# Patient Record
Sex: FEMALE | Employment: FULL TIME | URBAN - METROPOLITAN AREA
[De-identification: names, ages, dates, MRNs, and addresses within clinical notes are randomized per-mention and may not be internally consistent; named-entity substitution may affect disease eponyms.]

---

## 2019-03-29 ENCOUNTER — HOSPITAL ENCOUNTER (OUTPATIENT)
Dept: INFUSION THERAPY | Age: 29
Discharge: HOME | End: 2019-03-29
Payer: OTHER GOVERNMENT

## 2019-03-29 ENCOUNTER — OFFICE VISIT (OUTPATIENT)
Dept: ONCOLOGY | Age: 29
End: 2019-03-29

## 2019-03-29 VITALS
TEMPERATURE: 97.3 F | SYSTOLIC BLOOD PRESSURE: 110 MMHG | DIASTOLIC BLOOD PRESSURE: 67 MMHG | WEIGHT: 163 LBS | HEART RATE: 64 BPM | RESPIRATION RATE: 18 BRPM | OXYGEN SATURATION: 100 %

## 2019-03-29 VITALS
HEART RATE: 64 BPM | TEMPERATURE: 97.3 F | DIASTOLIC BLOOD PRESSURE: 67 MMHG | OXYGEN SATURATION: 100 % | SYSTOLIC BLOOD PRESSURE: 110 MMHG

## 2019-03-29 DIAGNOSIS — D64.9 ANEMIA, UNSPECIFIED TYPE: ICD-10-CM

## 2019-03-29 DIAGNOSIS — D64.9 ANEMIA, UNSPECIFIED TYPE: Primary | ICD-10-CM

## 2019-03-29 LAB
BASO+EOS+MONOS # BLD AUTO: 0.4 K/UL (ref 0–2.3)
BASO+EOS+MONOS NFR BLD AUTO: 7 % (ref 0.1–17)
DIFFERENTIAL METHOD BLD: ABNORMAL
ERYTHROCYTE [DISTWIDTH] IN BLOOD BY AUTOMATED COUNT: 14.1 % (ref 11.5–14.5)
FERRITIN SERPL-MCNC: 46 NG/ML (ref 8–388)
FOLATE SERPL-MCNC: >20 NG/ML (ref 3.1–17.5)
HCT VFR BLD AUTO: 35.4 % (ref 36–48)
HGB BLD-MCNC: 10.9 G/DL (ref 12–16)
IRON SATN MFR SERPL: 15 %
IRON SERPL-MCNC: 61 UG/DL (ref 50–175)
LYMPHOCYTES # BLD: 3.1 K/UL (ref 1.1–5.9)
LYMPHOCYTES NFR BLD: 51 % (ref 14–44)
MCH RBC QN AUTO: 24.3 PG (ref 25–35)
MCHC RBC AUTO-ENTMCNC: 30.8 G/DL (ref 31–37)
MCV RBC AUTO: 78.8 FL (ref 78–102)
NEUTS SEG # BLD: 2.6 K/UL (ref 1.8–9.5)
NEUTS SEG NFR BLD: 43 % (ref 40–70)
PLATELET # BLD AUTO: 331 K/UL (ref 140–440)
RBC # BLD AUTO: 4.49 M/UL (ref 4.1–5.1)
TIBC SERPL-MCNC: 394 UG/DL (ref 250–450)
VIT B12 SERPL-MCNC: 1172 PG/ML (ref 211–911)
WBC # BLD AUTO: 6.1 K/UL (ref 4.5–13)

## 2019-03-29 PROCEDURE — 82728 ASSAY OF FERRITIN: CPT

## 2019-03-29 PROCEDURE — 83021 HEMOGLOBIN CHROMOTOGRAPHY: CPT

## 2019-03-29 PROCEDURE — 36415 COLL VENOUS BLD VENIPUNCTURE: CPT

## 2019-03-29 PROCEDURE — 85025 COMPLETE CBC W/AUTO DIFF WBC: CPT

## 2019-03-29 PROCEDURE — 82607 VITAMIN B-12: CPT

## 2019-03-29 PROCEDURE — 83540 ASSAY OF IRON: CPT

## 2019-03-29 RX ORDER — POLYETHYLENE GLYCOL 3350 17 G/17G
POWDER, FOR SOLUTION ORAL
COMMUNITY
Start: 2019-02-02

## 2019-03-29 NOTE — PROGRESS NOTES
[facility]  [id number] [place] [address], [address]  [place], [facility]  Office Phone: [phone]  Fax: [phone]      Reason for visit:  Abnormal Lab Results      HPI:   Ebony Guerrero is a 25 y.o.  female  who I was asked to see in consultation at the request of Dr. Diaz Shea for evaluation for abnormal CBC. DX   Encounter Diagnosis   Name Primary?  Anemia, unspecified type Yes     PMHx: constipation    PSHx: 3 Csection    SHX:   Social History     Socioeconomic History    Marital status:      Spouse name: Not on file    Number of children: Not on file    Years of education: Not on file    Highest education level: Not on file     Allergies:  Review of Systems    All 12 point of comprehensive review of system are negative except for nausea and joint as well as fatigue. Objective:  Physical Exam:    Visit Vitals  /67 (BP 1 Location: Left arm, BP Patient Position: Sitting)   Pulse 64   Temp 97.3 °F (36.3 °C) (Oral)   Resp 18   Wt 73.9 kg (163 lb)   LMP 2019   SpO2 100%       General:  Alert, cooperative, no distress, appears stated age. Head:  Normocephalic, without obvious abnormality, atraumatic. Eyes:  Conjunctivae/corneas clear. PERRL, EOMs intact. Pale    Throat: Lips, mucosa, and tongue normal.    Neck: Supple, symmetrical, trachea midline, no adenopathy, thyroid: no enlargement/tenderness/nodules   Back:   Symmetric, no curvature. ROM normal. No CVA tenderness. Lungs:   Clear to auscultation bilaterally. Chest wall:  No tenderness or deformity. Heart:  Regular rate and rhythm, S1, S2 normal, no murmur, click, rub or gallop. Abdomen:   Soft, non-tender. Bowel sounds normal. No masses,  No organomegaly. Extremities: Extremities normal, atraumatic, no cyanosis or edema. Skin: Skin color, texture, turgor normal. No rashes or lesions.    Lymph nodes: Cervical, supraclavicular, and axillary nodes normal. Neurologic: CNII-XII intact. Diagnostic Imaging     No results found for this or any previous visit. Lab Results  No results found for: WBC, HGB, HGBP, HCT, PHCT, RBCH, PLT, MCV, HGBEXT, HCTEXT, PLTEXT    No results found for: NA, K, CL, CO2, AGAP, GLU, BUN, CREA, BUCR, GFRAA, GFRNA, CA, TBIL, GPT, SGOT, AP, TP, ALB, GLOB, AGRAT, ALT    Assessment/Plan:  25 y.o. female  1. Anemia, unspecified type  Patient has no previous records. Get records from PCP. Run below labs. - CBC WITH AUTOMATED DIFF; Future  - IRON PROFILE; Future  - FERRITIN; Future  - VITAMIN B12 & FOLATE; Future  - HEMOGLOBIN FRACTIONATION; Future  - HEMOGLOBIN FRACTIONATION; Future    2.  Menorrhagia: Needs obgyn    RTC in 1 weeks

## 2019-03-29 NOTE — PROGRESS NOTES
[facility] Progress Note Date: 2019 Name: Cy Marques MRN: [id number] : 1990 Peripheral Lab Draw Ms. Meadows to [facility], ambulatory at [phone] accompanied by self. Pt was assessed and education was provided. Ms. Yumi Back vitals were reviewed and patient was observed for 5 minutes prior to treatment. Visit Vitals /67 (BP 1 Location: Left arm, BP Patient Position: Sitting) Pulse 64 Temp 97.3 °F (36.3 °C) (Oral) SpO2 100% Blood obtained peripherally from left arm x 1 attempt with butterfly needle and sent to lab for Cbc w/ diff, Vitamin B12 & folate, Ferritin, Iron Profile, and Hemoglobin Fractionation per written orders. No bleeding or hematoma noted at site. Gauze and coban applied. Recent Results (from the past 12 hour(s)) CBC WITH 3 PART DIFF Collection Time: 19 11:02 AM  
Result Value Ref Range WBC 6.1 4.5 - 13.0 K/uL  
 RBC 4.49 4. 10 - 5.10 M/uL  
 HGB 10.9 (L) 12.0 - 16.0 g/dL HCT 35.4 (L) 36 - 48 % MCV 78.8 78 - 102 FL  
 MCH 24.3 (L) 25.0 - 35.0 PG  
 MCHC 30.8 (L) 31 - 37 g/dL  
 RDW 14.1 11.5 - 14.5 % PLATELET 836 487 - 979 K/uL NEUTROPHILS 43 40 - 70 % MIXED CELLS 7 0.1 - 17 % LYMPHOCYTES 51 (H) 14 - 44 % ABS. NEUTROPHILS 2.6 1.8 - 9.5 K/UL  
 ABS. MIXED CELLS 0.4 0.0 - 2.3 K/uL  
 ABS. LYMPHOCYTES 3.1 1.1 - 5.9 K/UL  
 DF AUTOMATED Ms. Ivania Alejandra tolerated the phlebotomy, and had no complaints. Patient armband removed and shredded. Ms. Ivania Alejandra was discharged from [place] [address] in stable condition at 1107. Luly Purvis 2019 
1:01 PM

## 2019-04-01 LAB
DEPRECATED HGB OTHER BLD-IMP: 0 %
HGB A MFR BLD: 97.7 % (ref 96.4–98.8)
HGB A2 MFR BLD COLUMN CHROM: 2.3 % (ref 1.8–3.2)
HGB C MFR BLD: 0 %
HGB F MFR BLD: 0 % (ref 0–2)
HGB FRACT BLD-IMP: NORMAL
HGB S BLD QL SOLY: NEGATIVE
HGB S MFR BLD: 0 %

## 2019-04-05 ENCOUNTER — HOSPITAL ENCOUNTER (OUTPATIENT)
Dept: INFUSION THERAPY | Age: 29
Discharge: HOME | End: 2019-04-05
Payer: OTHER GOVERNMENT

## 2019-04-05 VITALS
SYSTOLIC BLOOD PRESSURE: 107 MMHG | OXYGEN SATURATION: 99 % | DIASTOLIC BLOOD PRESSURE: 70 MMHG | RESPIRATION RATE: 17 BRPM | TEMPERATURE: 97.9 F | HEART RATE: 61 BPM

## 2019-04-05 PROCEDURE — 74011250636 HC RX REV CODE- 250/636: Performed by: INTERNAL MEDICINE

## 2019-04-05 PROCEDURE — 96365 THER/PROPH/DIAG IV INF INIT: CPT

## 2019-04-05 RX ADMIN — FERRIC CARBOXYMALTOSE INJECTION 750 MG: 50 INJECTION, SOLUTION INTRAVENOUS at 14:46

## 2019-04-05 NOTE — PROGRESS NOTES
[facility] Progress Note Date: 2019 Name: Paula Maher MRN: [id number] : 1990 Injectafer Infusion Ms. Meadows to [facility], ambulatory, at 1355. Pt was assessed and education was provided. Ms. Edgar Stout vitals were reviewed and patient was observed for 5 minutes prior to treatment. Visit Vitals /70 (BP 1 Location: Left arm, BP Patient Position: Sitting) Pulse 61 Temp 97.9 °F (36.6 °C) Resp 17 SpO2 99% Breastfeeding? No  
 
 
 
22g PIV placed in left antecubital x1 attempt. PIV flushed easily and had brisk blood return. Injectafer 750mg in 250mL 0.9% sodium chloride was infused over 20 minutes. Ms. Remberto Higginbotham tolerated the infusion, and had no complaints. VS remained stable. PIV flushed with NS 10 ml and removed. No bleeding or hematoma noted at site. Gauze and coban applied. Reviewed discharge instructions with patient, including expected side effects (abdominal cramping, nausea, changes in color of urine or feces) and signs of allergic reaction requiring medical attention (itching/hives/rashes, SOB, chest pain, lip/tongue/facial swelling). Patient given printed copy to take home. Patient verbalized understanding of discharge instructions. CareNotes provided, signed and scanned into patient chart. Patient armband removed and shredded. Ms. Remberto Higginbotham was discharged from [place] [address] in stable condition at 1552. She is to return on 19 at 1430 for her next [facility]fusion. Joane Scheuermann, RN 2019 
1552

## 2019-04-15 ENCOUNTER — HOSPITAL ENCOUNTER (OUTPATIENT)
Dept: INFUSION THERAPY | Age: 29
Discharge: HOME | End: 2019-04-15
Payer: OTHER GOVERNMENT

## 2019-04-15 VITALS
SYSTOLIC BLOOD PRESSURE: 114 MMHG | RESPIRATION RATE: 18 BRPM | DIASTOLIC BLOOD PRESSURE: 71 MMHG | TEMPERATURE: 98.7 F | OXYGEN SATURATION: 100 % | HEART RATE: 98 BPM

## 2019-04-15 PROCEDURE — 74011250636 HC RX REV CODE- 250/636: Performed by: INTERNAL MEDICINE

## 2019-04-15 PROCEDURE — 96365 THER/PROPH/DIAG IV INF INIT: CPT

## 2019-04-15 RX ADMIN — FERRIC CARBOXYMALTOSE INJECTION 750 MG: 50 INJECTION, SOLUTION INTRAVENOUS at 15:21

## 2019-04-15 NOTE — PROGRESS NOTES
[address] [facility] Progress Note Date: April 15, 2019 Name: Jyoti Bland MRN: [id number] : 1990 Injectafer Infusion 2 of 2. Ms. Mayur Stearns to [facility], [facility], at 12. Pt was assessed and education was provided. Ms. Jemma Carvajal vitals were reviewed and patient was observed for 5 minutes prior to treatment. Visit Vitals /71 (BP 1 Location: Left arm, BP Patient Position: Sitting) Pulse 98 Temp 98.7 °F (37.1 °C) Resp 18 SpO2 100% 24g PIV placed in left antecubital x1 attempt. PIV flushed easily and had brisk blood return. Injectafer 750mg in 250mL 0.9% sodium chloride was infused over 20 minutes. Ms. Mayur Stearns tolerated the infusion, and had no complaints. VS remained stable. PIV flushed with NS 10 ml and removed. No bleeding or hematoma noted at site. Gauze and coban applied. Patient armband removed and shredded. Ms. Mayur Stearns was discharged from [place] [address] in stable condition at 1600. She has no future appointments with hospitals at this time. Brenton Marques April 15, 2019 
[id number]

## 2019-04-23 ENCOUNTER — OFFICE VISIT (OUTPATIENT)
Dept: ONCOLOGY | Age: 29
End: 2019-04-23

## 2019-04-23 VITALS
SYSTOLIC BLOOD PRESSURE: 107 MMHG | HEART RATE: 71 BPM | WEIGHT: 168 LBS | TEMPERATURE: 97.8 F | DIASTOLIC BLOOD PRESSURE: 70 MMHG | RESPIRATION RATE: 18 BRPM | OXYGEN SATURATION: 100 %

## 2019-04-23 DIAGNOSIS — D64.9 ANEMIA, UNSPECIFIED TYPE: Primary | ICD-10-CM

## 2019-04-23 DIAGNOSIS — N92.1 MENORRHAGIA WITH IRREGULAR CYCLE: ICD-10-CM

## 2019-04-23 NOTE — PROGRESS NOTES
Sarah Banegas is a 25 y.o. female presenting today for a follow-up r/t iron deficiency anemia. Patient is ambulatory with no assistive devices and complain of difficultly sleeping on right side and back. Chief Complaint Patient presents with  Anemia  
  follow up Visit Vitals /70 (BP 1 Location: Right arm, BP Patient Position: Sitting) Pulse 71 Temp 97.8 °F (36.6 °C) (Oral) Resp 18 Wt 76.2 kg (168 lb) LMP 03/21/2019 (Approximate) SpO2 100% Current Outpatient Medications Medication Sig  polyethylene glycol (MIRALAX) 17 gram/dose powder No current facility-administered medications for this visit. Medications no longer taking/discontinued: miralax 3 most recent PHQ Screens 4/23/2019 Little interest or pleasure in doing things Not at all Feeling down, depressed, irritable, or hopeless Not at all Total Score PHQ 2 0  
 
 
 
1. Have you been to the ER, urgent care clinic since your last visit? Hospitalized since your last visit?no 2. Have you seen or consulted any other health care providers outside of the [address] [place] [address] since your last visit? Include any pap smears or colon screening.  no

## 2019-04-23 NOTE — PROGRESS NOTES
Yoan Hubbard is a 25 y.o. 1990 female   with iron deficiency anemia status post IV iron infusionx2 on 4/15/19, here for follow-up. 
  
 
History reviewed. No pertinent past medical history. Past Surgical History:  
Procedure Laterality Date  HX  SECTION    
 x3 Social History Socioeconomic History  Marital status:  Spouse name: Not on file  Number of children: Not on file  Years of education: Not on file  Highest education level: Not on file Tobacco Use  Smoking status: Never Smoker  Smokeless tobacco: Never Used Substance and Sexual Activity  Alcohol use: Not Currently Frequency: Never  Drug use: Never  Sexual activity: Yes  
  Partners: Male Birth control/protection: Surgical  
  Comment: Spouse - vasectomy Family History Problem Relation Age of Onset  No Known Problems Mother  No Known Problems Father Current Outpatient Medications Medication Sig Dispense Refill  polyethylene glycol (MIRALAX) 17 gram/dose powder Allergies Allergen Reactions  Chloroquine Itching Review of Systems A comprehensive review of systems was negative. Objective: 
Visit Vitals /70 (BP 1 Location: Right arm, BP Patient Position: Sitting) Pulse 71 Temp 97.8 °F (36.6 °C) (Oral) Resp 18 Wt 76.2 kg (168 lb) LMP 2019 (Approximate) SpO2 100% Physical Exam:  
General appearance - alert, well appearing, and in no distress Mental status - alert, oriented to person, place, and time EYE-MARKUS, EOMI 
ENT-ENT exam normal, no neck nodes or sinus tenderness Mouth - mucous membranes moist, pharynx normal without lesions Neck - supple, no significant adenopathy Chest - clear to auscultation, no wheezes, rales or rhonchi, symmetric air entry Heart - normal rate and regular rhythm Abdomen - soft, nontender, nondistended, no masses or organomegaly Lymph- no adenopathy palpable Ext-no pedal edema noted Skin-Warm and dry. Neuro -alert, oriented, normal speech, no focal findings or movement disorder noted Diagnostic Imaging No results found for this or any previous visit. No results found for this or any previous visit. No results found for this or any previous visit. Lab Results Lab Results Component Value Date/Time WBC 6.1 03/29/2019 11:02 AM  
 HGB 10.9 (L) 03/29/2019 11:02 AM  
 HCT 35.4 (L) 03/29/2019 11:02 AM  
 PLATELET 637 77/27/3965 11:02 AM  
 MCV 78.8 03/29/2019 11:02 AM  
 
 
No results found for: NA, K, CL, CO2, AGAP, GLU, BUN, CREA, BUCR, GFRAA, GFRNA, CA, TBIL, GPT, SGOT, AP, TP, ALB, GLOB, AGRAT, ALT Assessment/Plan: 1. Anemia, unspecified type Patient with iron deficiency anemia status post iron infusion x2 on 4/15/19. Repeat CBC, CMP, Ferritin and iron panel one week before next appointment 
  
2. Menorrhagia: Refer obgyn RTC 3 months 
  
 
 
 
Vonda Carlisle MD

## 2019-07-17 ENCOUNTER — HOSPITAL ENCOUNTER (OUTPATIENT)
Dept: INFUSION THERAPY | Age: 29
Discharge: HOME | End: 2019-07-17
Payer: OTHER GOVERNMENT

## 2019-07-17 VITALS — SYSTOLIC BLOOD PRESSURE: 104 MMHG | DIASTOLIC BLOOD PRESSURE: 62 MMHG | HEART RATE: 64 BPM | TEMPERATURE: 97.4 F

## 2019-07-17 DIAGNOSIS — D64.9 ANEMIA, UNSPECIFIED TYPE: ICD-10-CM

## 2019-07-17 LAB
ALBUMIN SERPL-MCNC: 3.8 G/DL (ref 3.4–5)
ALBUMIN/GLOB SERPL: 1 {RATIO} (ref 0.8–1.7)
ALP SERPL-CCNC: 50 U/L (ref 45–117)
ALT SERPL-CCNC: 21 U/L (ref 13–56)
ANION GAP SERPL CALC-SCNC: 4 MMOL/L (ref 3–18)
AST SERPL-CCNC: 9 U/L (ref 15–37)
BASO+EOS+MONOS # BLD AUTO: 0.4 K/UL (ref 0–2.3)
BASO+EOS+MONOS NFR BLD AUTO: 7 % (ref 0.1–17)
BILIRUB SERPL-MCNC: 0.4 MG/DL (ref 0.2–1)
BUN SERPL-MCNC: 12 MG/DL (ref 7–18)
BUN/CREAT SERPL: 15 (ref 12–20)
CALCIUM SERPL-MCNC: 9 MG/DL (ref 8.5–10.1)
CHLORIDE SERPL-SCNC: 105 MMOL/L (ref 100–108)
CO2 SERPL-SCNC: 28 MMOL/L (ref 21–32)
CREAT SERPL-MCNC: 0.79 MG/DL (ref 0.6–1.3)
DIFFERENTIAL METHOD BLD: ABNORMAL
ERYTHROCYTE [DISTWIDTH] IN BLOOD BY AUTOMATED COUNT: 13.6 % (ref 11.5–14.5)
FERRITIN SERPL-MCNC: 445 NG/ML (ref 8–388)
GLOBULIN SER CALC-MCNC: 4 G/DL (ref 2–4)
GLUCOSE SERPL-MCNC: 81 MG/DL (ref 74–99)
HCT VFR BLD AUTO: 36.5 % (ref 36–48)
HGB BLD-MCNC: 11.3 G/DL (ref 12–16)
IRON SATN MFR SERPL: 32 %
IRON SERPL-MCNC: 103 UG/DL (ref 50–175)
LYMPHOCYTES # BLD: 2.6 K/UL (ref 1.1–5.9)
LYMPHOCYTES NFR BLD: 44 % (ref 14–44)
MCH RBC QN AUTO: 24.7 PG (ref 25–35)
MCHC RBC AUTO-ENTMCNC: 31 G/DL (ref 31–37)
MCV RBC AUTO: 79.9 FL (ref 78–102)
NEUTS SEG # BLD: 3 K/UL (ref 1.8–9.5)
NEUTS SEG NFR BLD: 50 % (ref 40–70)
PLATELET # BLD AUTO: 254 K/UL (ref 140–440)
POTASSIUM SERPL-SCNC: 4 MMOL/L (ref 3.5–5.5)
PROT SERPL-MCNC: 7.8 G/DL (ref 6.4–8.2)
RBC # BLD AUTO: 4.57 M/UL (ref 4.1–5.1)
SODIUM SERPL-SCNC: 137 MMOL/L (ref 136–145)
TIBC SERPL-MCNC: 320 UG/DL (ref 250–450)
WBC # BLD AUTO: 6 K/UL (ref 4.5–13)

## 2019-07-17 PROCEDURE — 85025 COMPLETE CBC W/AUTO DIFF WBC: CPT

## 2019-07-17 PROCEDURE — 80053 COMPREHEN METABOLIC PANEL: CPT

## 2019-07-17 PROCEDURE — 36415 COLL VENOUS BLD VENIPUNCTURE: CPT

## 2019-07-17 PROCEDURE — 83540 ASSAY OF IRON: CPT

## 2019-07-17 PROCEDURE — 82728 ASSAY OF FERRITIN: CPT

## 2019-07-17 NOTE — PROGRESS NOTES
[facility] Progress Note    Date: 2019    Name: Oriana Alvarez    MRN: [de-identified]         : 1990    Peripheral Lab Draw      Ms. Meadows to [facility], ambulatory at 0935 accompanied by self. Pt was assessed and education was provided. Ms. Yancy Barber vitals were reviewed and patient was observed for 5 minutes prior to treatment. Visit Vitals  /62 (BP 1 Location: Left arm, BP Patient Position: Sitting)   Pulse 64   Temp 97.4 °F (36.3 °C)     Recent Results (from the past 12 hour(s))   CBC WITH 3 PART DIFF    Collection Time: 19  9:40 AM   Result Value Ref Range    WBC 6.0 4.5 - 13.0 K/uL    RBC 4.57 4.10 - 5.10 M/uL    HGB 11.3 (L) 12.0 - 16.0 g/dL    HCT 36.5 36 - 48 %    MCV 79.9 78 - 102 FL    MCH 24.7 (L) 25.0 - 35.0 PG    MCHC 31.0 31 - 37 g/dL    RDW 13.6 11.5 - 14.5 %    PLATELET 978 349 - 908 K/uL    NEUTROPHILS 50 40 - 70 %    MIXED CELLS 7 0.1 - 17 %    LYMPHOCYTES 44 14 - 44 %    ABS. NEUTROPHILS 3.0 1.8 - 9.5 K/UL    ABS. MIXED CELLS 0.4 0.0 - 2.3 K/uL    ABS. LYMPHOCYTES 2.6 1.1 - 5.9 K/UL    DF AUTOMATED         Blood obtained peripherally from left arm x 1 attempt with butterfly needle and sent to lab for Cbc w/diff, Cmp Iron Profile and Ferritin per written orders. No bleeding or hematoma noted at site. Gauze and coban applied. Ms. Jayme Hardy tolerated the phlebotomy, and had no complaints. Patient armband removed and shredded. Ms. Jayme Hardy was discharged from [place] [address] in stable condition at 10 [facility] Jayme Gerardo Phlebotomist PCT  2019  9:46 AM

## 2019-07-23 ENCOUNTER — OFFICE VISIT (OUTPATIENT)
Dept: ONCOLOGY | Age: 29
End: 2019-07-23

## 2019-07-23 VITALS
OXYGEN SATURATION: 99 % | WEIGHT: 171 LBS | SYSTOLIC BLOOD PRESSURE: 104 MMHG | HEART RATE: 65 BPM | RESPIRATION RATE: 16 BRPM | TEMPERATURE: 97.1 F | DIASTOLIC BLOOD PRESSURE: 59 MMHG

## 2019-07-23 DIAGNOSIS — D50.9 IRON DEFICIENCY ANEMIA, UNSPECIFIED IRON DEFICIENCY ANEMIA TYPE: Primary | ICD-10-CM

## 2019-07-23 NOTE — PROGRESS NOTES
Ema Forte is a 30 y.o. 1990 female and presents with Anemia (follow up)  Patient with history of menorrhagia and iron deficiency anemia, status post IV iron Injectafer x2 completed on 4/15/2019, here for follow-up. Patient was seen and examined today. She is awake alert oriented x3. Her labs on 2019 showed transferrin saturation 32%, ferritin 445, WBC 6.0, H&H 11.3/36.5, platelet 800. MCV 80. On review of systems today she denies any fevers, chills, shortness of breath, nausea vomiting or abdominal pain. Easy fatigue has resolved. She says the heaviness of her menses are much better now but still occasionally having some blood clots. Denies any pagophagia or pica. ECOG performance status 0. Independent with ADLs and IADLs. History reviewed. No pertinent past medical history. Past Surgical History:   Procedure Laterality Date    HX  SECTION      x3     Social History     Socioeconomic History    Marital status:      Spouse name: Not on file    Number of children: Not on file    Years of education: Not on file    Highest education level: Not on file   Tobacco Use    Smoking status: Never Smoker    Smokeless tobacco: Never Used   Substance and Sexual Activity    Alcohol use: Not Currently     Frequency: Never    Drug use: Never    Sexual activity: Yes     Partners: Male     Birth control/protection: Surgical     Comment: Spouse - vasectomy     Family History   Problem Relation Age of Onset    No Known Problems Mother     No Known Problems Father        Current Outpatient Medications   Medication Sig Dispense Refill    polyethylene glycol (MIRALAX) 17 gram/dose powder          Allergies   Allergen Reactions    Chloroquine Itching       Review of Systems    All 12 points of  comprehensive review of systems was negative except for: Heavy menses which are getting better.     Objective:  Visit Vitals  /59 (BP 1 Location: Left arm, BP Patient Position: Sitting)   Pulse 65   Temp 97.1 °F (36.2 °C) (Oral)   Resp 16   Wt 77.6 kg (171 lb)   LMP 07/22/2019   SpO2 99%         Physical Exam:   General appearance - alert, well appearing, and in no distress  Mental status - alert, oriented to person, place, and time  EYE-MARKUS, EOMI  ENT-ENT exam normal, no neck nodes or sinus tenderness  Mouth - mucous membranes moist, pharynx normal without lesions  Neck - supple, no significant adenopathy   Chest - clear to auscultation, no wheezes, rales or rhonchi, symmetric air entry   Heart - normal rate and regular rhythm   Abdomen - soft, nontender, nondistended, no masses or organomegaly  Lymph- no adenopathy palpable  Ext-no pedal edema noted  Skin-Warm and dry. Neuro -alert, oriented, normal speech, no focal findings or movement disorder noted      Diagnostic Imaging     No results found for this or any previous visit. No results found for this or any previous visit. No results found for this or any previous visit. Lab Results  Lab Results   Component Value Date/Time    WBC 6.0 07/17/2019 09:40 AM    HGB 11.3 (L) 07/17/2019 09:40 AM    HCT 36.5 07/17/2019 09:40 AM    PLATELET 773 40/49/8305 09:40 AM    MCV 79.9 07/17/2019 09:40 AM       Lab Results   Component Value Date/Time    Sodium 137 07/17/2019 09:40 AM    Potassium 4.0 07/17/2019 09:40 AM    Chloride 105 07/17/2019 09:40 AM    CO2 28 07/17/2019 09:40 AM    Anion gap 4 07/17/2019 09:40 AM    Glucose 81 07/17/2019 09:40 AM    BUN 12 07/17/2019 09:40 AM    Creatinine 0.79 07/17/2019 09:40 AM    BUN/Creatinine ratio 15 07/17/2019 09:40 AM    GFR est AA >60 07/17/2019 09:40 AM    GFR est non-AA >60 07/17/2019 09:40 AM    Calcium 9.0 07/17/2019 09:40 AM    AST (SGOT) 9 (L) 07/17/2019 09:40 AM    Alk.  phosphatase 50 07/17/2019 09:40 AM    Protein, total 7.8 07/17/2019 09:40 AM    Albumin 3.8 07/17/2019 09:40 AM    Globulin 4.0 07/17/2019 09:40 AM    A-G Ratio 1.0 07/17/2019 09:40 AM    ALT (SGPT) 21 07/17/2019 09:40 AM     Follow-up with PCP for health maintenance. Assessment/Plan:  1. Anemia, unspecified type  Patient with iron deficiency anemia status post iron infusion x2 on 4/15/19. She is clinically doing very well. On 7/17/2019, transferrin saturation with 32% and ferritin 445.  No need for more iron transfusion for the moment.       2. Menorrhagia: Follow-up with OB/GYN as scheduled.     RTC 3 months with repeat CBC, ferritin and iron profile      Kalyan Hardy MD

## 2019-07-23 NOTE — PROGRESS NOTES
Arin Catalan is a 30 y.o. female presenting today for a follow-up r/t iron deficiency anemia. Patient is ambulatory with no assistive devices and denies any complaints. Chief Complaint   Patient presents with    Anemia     follow up       Visit Vitals  /59 (BP 1 Location: Left arm, BP Patient Position: Sitting)   Pulse 65   Temp 97.1 °F (36.2 °C) (Oral)   Resp 16   Wt 77.6 kg (171 lb)   LMP 07/22/2019   SpO2 99%       Current Outpatient Medications   Medication Sig    polyethylene glycol (MIRALAX) 17 gram/dose powder      No current facility-administered medications for this visit. Medications no longer taking/discontinued:     No flowsheet data found. 3 most recent PHQ Screens 4/23/2019   Little interest or pleasure in doing things Not at all   Feeling down, depressed, irritable, or hopeless Not at all   Total Score PHQ 2 0       No flowsheet data found. 1. Have you been to the ER, urgent care clinic since your last visit? Hospitalized since your last visit?no    2. Have you seen or consulted any other health care providers outside of the [address] [place] [address] since your last visit? Include any pap smears or colon screening.  no

## 2019-10-16 ENCOUNTER — HOSPITAL ENCOUNTER (OUTPATIENT)
Dept: INFUSION THERAPY | Age: 29
Discharge: HOME | End: 2019-10-16
Payer: OTHER GOVERNMENT

## 2019-10-16 VITALS
SYSTOLIC BLOOD PRESSURE: 111 MMHG | HEART RATE: 71 BPM | TEMPERATURE: 97.9 F | DIASTOLIC BLOOD PRESSURE: 69 MMHG | OXYGEN SATURATION: 99 %

## 2019-10-16 DIAGNOSIS — D64.9 ANEMIA, UNSPECIFIED TYPE: ICD-10-CM

## 2019-10-16 DIAGNOSIS — D50.9 IRON DEFICIENCY ANEMIA, UNSPECIFIED IRON DEFICIENCY ANEMIA TYPE: ICD-10-CM

## 2019-10-16 LAB
BASO+EOS+MONOS # BLD AUTO: 0.3 K/UL (ref 0–2.3)
BASO+EOS+MONOS NFR BLD AUTO: 7 % (ref 0.1–17)
DIFFERENTIAL METHOD BLD: ABNORMAL
ERYTHROCYTE [DISTWIDTH] IN BLOOD BY AUTOMATED COUNT: 13.1 % (ref 11.5–14.5)
FERRITIN SERPL-MCNC: 159 NG/ML (ref 8–388)
HCT VFR BLD AUTO: 33.4 % (ref 36–48)
HGB BLD-MCNC: 10.2 G/DL (ref 12–16)
IRON SATN MFR SERPL: 16 %
IRON SERPL-MCNC: 57 UG/DL (ref 50–175)
LYMPHOCYTES # BLD: 2.6 K/UL (ref 1.1–5.9)
LYMPHOCYTES NFR BLD: 53 % (ref 14–44)
MCH RBC QN AUTO: 24.6 PG (ref 25–35)
MCHC RBC AUTO-ENTMCNC: 30.5 G/DL (ref 31–37)
MCV RBC AUTO: 80.5 FL (ref 78–102)
NEUTS SEG # BLD: 2 K/UL (ref 1.8–9.5)
NEUTS SEG NFR BLD: 40 % (ref 40–70)
PLATELET # BLD AUTO: 284 K/UL (ref 140–440)
RBC # BLD AUTO: 4.15 M/UL (ref 4.1–5.1)
TIBC SERPL-MCNC: 358 UG/DL (ref 250–450)
WBC # BLD AUTO: 4.9 K/UL (ref 4.5–13)

## 2019-10-16 PROCEDURE — 85025 COMPLETE CBC W/AUTO DIFF WBC: CPT

## 2019-10-16 PROCEDURE — 83021 HEMOGLOBIN CHROMOTOGRAPHY: CPT

## 2019-10-16 PROCEDURE — 36415 COLL VENOUS BLD VENIPUNCTURE: CPT

## 2019-10-16 PROCEDURE — 82728 ASSAY OF FERRITIN: CPT

## 2019-10-16 PROCEDURE — 83540 ASSAY OF IRON: CPT

## 2019-10-16 NOTE — PROGRESS NOTES
[address] [facility] Progress Note    Date: 2019    Name: Asim Tillman    MRN: [de-identified]         : 1990    Peripheral Lab Draw      Ms. Meadows to [facility], ambulatory at 1000 accompanied by self. Pt was assessed and education was provided. Ms. Ben Dick vitals were reviewed and patient was observed for 5 minutes prior to treatment. Visit Vitals  /69 (BP 1 Location: Left arm, BP Patient Position: Sitting)   Pulse 71   Temp 97.9 °F (36.6 °C)   SpO2 99%     Recent Results (from the past 12 hour(s))   CBC WITH 3 PART DIFF    Collection Time: 10/16/19 10:05 AM   Result Value Ref Range    WBC 4.9 4.5 - 13.0 K/uL    RBC 4.15 4.10 - 5.10 M/uL    HGB 10.2 (L) 12.0 - 16.0 g/dL    HCT 33.4 (L) 36 - 48 %    MCV 80.5 78 - 102 FL    MCH 24.6 (L) 25.0 - 35.0 PG    MCHC 30.5 (L) 31 - 37 g/dL    RDW 13.1 11.5 - 14.5 %    PLATELET 315 181 - 380 K/uL    NEUTROPHILS 40 40 - 70 %    MIXED CELLS 7 0.1 - 17 %    LYMPHOCYTES 53 (H) 14 - 44 %    ABS. NEUTROPHILS 2.0 1.8 - 9.5 K/UL    ABS. MIXED CELLS 0.3 0.0 - 2.3 K/uL    ABS. LYMPHOCYTES 2.6 1.1 - 5.9 K/UL    DF AUTOMATED         Blood obtained peripherally from left arm x 1 attempt with butterfly needle and sent to lab for Cbc w/diff, Iron Profile and Ferritin per written orders. No bleeding or hematoma noted at site. Gauze and coban applied. Ms. Allan Redman tolerated the phlebotomy, and had no complaints. Patient armband removed and shredded. Ms. Allan Redman was discharged from [place] [address] in stable condition at 1010.      Shirlean Harada Phlebotomist PCT  2019  12:45 PM

## 2019-11-01 ENCOUNTER — OFFICE VISIT (OUTPATIENT)
Dept: ONCOLOGY | Age: 29
End: 2019-11-01

## 2019-11-01 VITALS
WEIGHT: 172.4 LBS | OXYGEN SATURATION: 100 % | DIASTOLIC BLOOD PRESSURE: 72 MMHG | SYSTOLIC BLOOD PRESSURE: 125 MMHG | HEART RATE: 82 BPM | RESPIRATION RATE: 18 BRPM

## 2019-11-01 DIAGNOSIS — E61.1 IRON DEFICIENCY: Primary | ICD-10-CM

## 2019-11-01 DIAGNOSIS — N92.4 MENORRHAGIA, PREMENOPAUSAL: ICD-10-CM

## 2019-11-01 PROBLEM — N92.0 MENORRHAGIA: Status: ACTIVE | Noted: 2019-11-01

## 2019-11-01 RX ORDER — CHLORHEXIDINE GLUCONATE 1.2 MG/ML
RINSE ORAL
Refills: 0 | COMMUNITY
Start: 2019-08-07

## 2019-11-01 NOTE — PROGRESS NOTES
Amari Desai is a 30 y.o. female presenting today for a follow-up appointment. Patient is ambulatory with no assistive devices and reports fatigue. Chief Complaint   Patient presents with    Anemia       Visit Vitals  /72   Pulse 82   Resp 18   Wt 172 lb 6.4 oz (78.2 kg)   LMP 09/30/2019 (Within Days)   SpO2 100%       Current Outpatient Medications   Medication Sig    chlorhexidine (PERIDEX) 0.12 % solution Rinse with 1/2 ounce (15 MILLILITERS) by mouth for 30 seconds then spit out. use twice a day    polyethylene glycol (MIRALAX) 17 gram/dose powder      No current facility-administered medications for this visit. Medications no longer taking/discontinued: chlorhexidine solution, Miralax      3 most recent PHQ Screens 4/23/2019   Little interest or pleasure in doing things Not at all   Feeling down, depressed, irritable, or hopeless Not at all   Total Score PHQ 2 0       1. Have you been to the ER, urgent care clinic since your last visit? Hospitalized since your last visit? No    2. Have you seen or consulted any other health care providers outside of the [address] [place] [address] since your last visit? Include any pap smears or colon screening.  No

## 2019-11-01 NOTE — PROGRESS NOTES
Corky Ward is a 30 y.o. 1990 female with Patient with history of menorrhagia and iron deficiency anemia, status post IV iron Injectafer x2 completed on 4/15/2019, here for follow-up. Patient was seen and examined today. She is awake alert oriented x3. Her labs on 10/16/2019 showed WBC 4.9, H&H 10.2/33.4, platelet 043. MCV 80.5, transferrin saturation 16%, ferritin 159. On review of systems today she denies any fevers, chills, shortness of breath, nausea vomiting or abdominal pain. Easy fatigue has resolved. She says is still having heavy menses. She is back to chewing ice. Also reports  ECOG performance status 0. Independent with ADLs and IADLs. Easy fatigue. LMP:10/2/19-Heavy       No past medical history on file. Past Surgical History:   Procedure Laterality Date    HX  SECTION      x3     Social History     Socioeconomic History    Marital status:      Spouse name: Not on file    Number of children: Not on file    Years of education: Not on file    Highest education level: Not on file   Tobacco Use    Smoking status: Never Smoker    Smokeless tobacco: Never Used   Substance and Sexual Activity    Alcohol use: Not Currently     Frequency: Never    Drug use: Never    Sexual activity: Yes     Partners: Male     Birth control/protection: Surgical     Comment: Spouse - vasectomy     Family History   Problem Relation Age of Onset    No Known Problems Mother     No Known Problems Father        Current Outpatient Medications   Medication Sig Dispense Refill    polyethylene glycol (MIRALAX) 17 gram/dose powder          Allergies   Allergen Reactions    Chloroquine Itching       Review of Systems  On review of systems today she denies any fevers, chills, shortness of breath, nausea vomiting or abdominal pain. Easy fatigue has resolved. She says is still having heavy menses. She is back to chewing ice. Also reports  ECOG performance status 0.   Independent with ADLs and IADLs. Easy fatigue.         Objective:  Visit Vitals  /72   Pulse 82   Resp 18   Wt 78.2 kg (172 lb 6.4 oz)   LMP 09/30/2019 (Within Days)   SpO2 100%       Physical Exam:   General appearance - alert, well appearing, and in no distress  Mental status - alert, oriented to person, place, and time  EYE-MARKUS, EOMI  ENT-ENT exam normal, no neck nodes or sinus tenderness  Mouth - mucous membranes moist, pharynx normal without lesions  Neck - supple, no significant adenopathy   Chest - clear to auscultation, no wheezes, rales or rhonchi, symmetric air entry   Heart - normal rate and regular rhythm   Abdomen - soft, nontender, nondistended, no masses or organomegaly  Lymph- no adenopathy palpable  Ext-no pedal edema noted  Skin-Warm and dry. Neuro -alert, oriented, normal speech, no focal findings or movement disorder noted      Diagnostic Imaging     No results found for this or any previous visit. No results found for this or any previous visit. No results found for this or any previous visit. Lab Results  Lab Results   Component Value Date/Time    WBC 4.9 10/16/2019 10:05 AM    HGB 10.2 (L) 10/16/2019 10:05 AM    HCT 33.4 (L) 10/16/2019 10:05 AM    PLATELET 607 33/66/2097 10:05 AM    MCV 80.5 10/16/2019 10:05 AM       Lab Results   Component Value Date/Time    Sodium 137 07/17/2019 09:40 AM    Potassium 4.0 07/17/2019 09:40 AM    Chloride 105 07/17/2019 09:40 AM    CO2 28 07/17/2019 09:40 AM    Anion gap 4 07/17/2019 09:40 AM    Glucose 81 07/17/2019 09:40 AM    BUN 12 07/17/2019 09:40 AM    Creatinine 0.79 07/17/2019 09:40 AM    BUN/Creatinine ratio 15 07/17/2019 09:40 AM    GFR est AA >60 07/17/2019 09:40 AM    GFR est non-AA >60 07/17/2019 09:40 AM    Calcium 9.0 07/17/2019 09:40 AM    AST (SGOT) 9 (L) 07/17/2019 09:40 AM    Alk.  phosphatase 50 07/17/2019 09:40 AM    Protein, total 7.8 07/17/2019 09:40 AM    Albumin 3.8 07/17/2019 09:40 AM    Globulin 4.0 07/17/2019 09:40 AM    A-G Ratio 1.0 07/17/2019 09:40 AM    ALT (SGPT) 21 07/17/2019 09:40 AM       Follow-up with PCP for health maintenance. Assessment/Plan:  1. Anemia, unspecified type  Patient with iron deficiency anemia status post iron infusion x2 on 4/15/19. She is clinically doing very well. On 7/17/2019, transferrin saturation with 32% and ferritin 445. On 10/16/2019, WBC 4.9, H&H 10.2/33.4, platelet 541. MCV 80.5, transferrin saturation 16%, ferritin 159. Also a ferritin is 159 patient is becoming iron deficient again with a transferrin saturation was decreased almost by 50% to 16%. She does not want oral iron for now. Recommended PATCH MD iron plus.      2. Menorrhagia: Follow-up with OB/GYN as scheduled. HAve not seen them yet     RTC 3 months with repeat CBC, ferritin and iron profile          Ernesto Matthew MD

## 2020-01-29 ENCOUNTER — HOSPITAL ENCOUNTER (OUTPATIENT)
Dept: INFUSION THERAPY | Age: 30
Discharge: HOME | End: 2020-01-29
Payer: OTHER GOVERNMENT

## 2020-01-29 DIAGNOSIS — E61.1 IRON DEFICIENCY: ICD-10-CM

## 2020-01-29 LAB
BASO+EOS+MONOS # BLD AUTO: 0.4 K/UL (ref 0–2.3)
BASO+EOS+MONOS NFR BLD AUTO: 7 % (ref 0.1–17)
CRP SERPL-MCNC: <0.3 MG/DL (ref 0–0.3)
DIFFERENTIAL METHOD BLD: ABNORMAL
ERYTHROCYTE [DISTWIDTH] IN BLOOD BY AUTOMATED COUNT: 13.8 % (ref 11.5–14.5)
ERYTHROCYTE [SEDIMENTATION RATE] IN BLOOD: 18 MM/HR (ref 0–20)
FERRITIN SERPL-MCNC: 308 NG/ML (ref 8–388)
HCT VFR BLD AUTO: 38.2 % (ref 36–48)
HGB BLD-MCNC: 12 G/DL (ref 12–16)
IRON SATN MFR SERPL: 16 % (ref 20–50)
IRON SERPL-MCNC: 55 UG/DL (ref 50–175)
LYMPHOCYTES # BLD: 2.6 K/UL (ref 1.1–5.9)
LYMPHOCYTES NFR BLD: 45 % (ref 14–44)
MCH RBC QN AUTO: 24.9 PG (ref 25–35)
MCHC RBC AUTO-ENTMCNC: 31.4 G/DL (ref 31–37)
MCV RBC AUTO: 79.3 FL (ref 78–102)
NEUTS SEG # BLD: 2.8 K/UL (ref 1.8–9.5)
NEUTS SEG NFR BLD: 48 % (ref 40–70)
PLATELET # BLD AUTO: 314 K/UL (ref 140–440)
RBC # BLD AUTO: 4.82 M/UL (ref 4.1–5.1)
TIBC SERPL-MCNC: 354 UG/DL (ref 250–450)
WBC # BLD AUTO: 5.8 K/UL (ref 4.5–13)

## 2020-01-29 PROCEDURE — 86140 C-REACTIVE PROTEIN: CPT

## 2020-01-29 PROCEDURE — 85025 COMPLETE CBC W/AUTO DIFF WBC: CPT

## 2020-01-29 PROCEDURE — 82728 ASSAY OF FERRITIN: CPT

## 2020-01-29 PROCEDURE — 83540 ASSAY OF IRON: CPT

## 2020-01-29 PROCEDURE — 36415 COLL VENOUS BLD VENIPUNCTURE: CPT

## 2020-01-29 PROCEDURE — 85652 RBC SED RATE AUTOMATED: CPT

## 2020-01-29 NOTE — PROGRESS NOTES
[facility] Progress Note    Date: 2020    Name: Bobbye Moritz    MRN: [de-identified]         : 1990    Peripheral Lab Draw      Ms. Meadows to [facility], ambulatory at 1020 accompanied by self. Pt was assessed and education was provided. Ms. Conti Adjutant vitals were reviewed and patient was observed for 5 minutes prior to treatment. Recent Results (from the past 12 hour(s))   CBC WITH 3 PART DIFF    Collection Time: 20 10:30 AM   Result Value Ref Range    WBC 5.8 4.5 - 13.0 K/uL    RBC 4.82 4.10 - 5.10 M/uL    HGB 12.0 12.0 - 16.0 g/dL    HCT 38.2 36 - 48 %    MCV 79.3 78 - 102 FL    MCH 24.9 (L) 25.0 - 35.0 PG    MCHC 31.4 31 - 37 g/dL    RDW 13.8 11.5 - 14.5 %    PLATELET 964 662 - 272 K/uL    NEUTROPHILS 48 40 - 70 %    MIXED CELLS 7 0.1 - 17 %    LYMPHOCYTES 45 (H) 14 - 44 %    ABS. NEUTROPHILS 2.8 1.8 - 9.5 K/UL    ABS. MIXED CELLS 0.4 0.0 - 2.3 K/uL    ABS. LYMPHOCYTES 2.6 1.1 - 5.9 K/UL    DF AUTOMATED         Blood obtained peripherally from left arm x 1 attempt with butterfly needle and sent to lab for Cbc w/diff, Iron Profile, Ferritin, Sed rate and Crp per written orders. No bleeding or hematoma noted at site. Gauze and coban applied. Ms. Laura Stoner tolerated the phlebotomy, and had no complaints. Patient armband removed and shredded. Ms. Laura Stoner was discharged from [place] [address] in stable condition at 1030.      [facility] Phle[facility]om[facility] PCT  2020  11:40 AM

## 2020-02-04 RX ORDER — HYDROCORTISONE SODIUM SUCCINATE 100 MG/2ML
100 INJECTION, POWDER, FOR SOLUTION INTRAMUSCULAR; INTRAVENOUS AS NEEDED
Status: CANCELLED | OUTPATIENT
Start: 2020-02-05

## 2020-02-04 RX ORDER — DIPHENHYDRAMINE HYDROCHLORIDE 50 MG/ML
50 INJECTION, SOLUTION INTRAMUSCULAR; INTRAVENOUS AS NEEDED
Status: CANCELLED
Start: 2020-02-05

## 2020-02-04 RX ORDER — SODIUM CHLORIDE 9 MG/ML
10 INJECTION INTRAMUSCULAR; INTRAVENOUS; SUBCUTANEOUS AS NEEDED
Status: CANCELLED | OUTPATIENT
Start: 2020-02-05

## 2020-02-04 RX ORDER — ONDANSETRON 2 MG/ML
8 INJECTION INTRAMUSCULAR; INTRAVENOUS AS NEEDED
Status: CANCELLED | OUTPATIENT
Start: 2020-02-05

## 2020-02-04 RX ORDER — ACETAMINOPHEN 325 MG/1
650 TABLET ORAL AS NEEDED
Status: CANCELLED
Start: 2020-02-05

## 2020-02-04 RX ORDER — EPINEPHRINE 1 MG/ML
0.3 INJECTION, SOLUTION, CONCENTRATE INTRAVENOUS AS NEEDED
Status: CANCELLED | OUTPATIENT
Start: 2020-02-05

## 2020-02-04 RX ORDER — SODIUM CHLORIDE 9 MG/ML
25 INJECTION, SOLUTION INTRAVENOUS CONTINUOUS
Status: CANCELLED | OUTPATIENT
Start: 2020-02-05

## 2020-02-04 RX ORDER — SODIUM CHLORIDE 0.9 % (FLUSH) 0.9 %
10 SYRINGE (ML) INJECTION AS NEEDED
Status: CANCELLED
Start: 2020-02-05

## 2020-02-04 RX ORDER — DIPHENHYDRAMINE HYDROCHLORIDE 50 MG/ML
25 INJECTION, SOLUTION INTRAMUSCULAR; INTRAVENOUS AS NEEDED
Status: CANCELLED
Start: 2020-02-05

## 2020-02-04 RX ORDER — ALBUTEROL SULFATE 0.83 MG/ML
2.5 SOLUTION RESPIRATORY (INHALATION) AS NEEDED
Status: CANCELLED
Start: 2020-02-05

## 2020-02-04 RX ORDER — HEPARIN 100 UNIT/ML
300-500 SYRINGE INTRAVENOUS AS NEEDED
Status: CANCELLED
Start: 2020-02-05

## 2020-02-05 ENCOUNTER — HOSPITAL ENCOUNTER (OUTPATIENT)
Dept: INFUSION THERAPY | Age: 30
End: 2020-02-05

## 2020-02-05 ENCOUNTER — OFFICE VISIT (OUTPATIENT)
Dept: ONCOLOGY | Age: 30
End: 2020-02-05

## 2020-02-05 VITALS
DIASTOLIC BLOOD PRESSURE: 59 MMHG | HEART RATE: 80 BPM | OXYGEN SATURATION: 99 % | RESPIRATION RATE: 18 BRPM | TEMPERATURE: 98.4 F | BODY MASS INDEX: 28.28 KG/M2 | SYSTOLIC BLOOD PRESSURE: 103 MMHG | WEIGHT: 176 LBS | HEIGHT: 66 IN

## 2020-02-05 DIAGNOSIS — N92.4 EXCESSIVE BLEEDING IN PREMENOPAUSAL PERIOD: ICD-10-CM

## 2020-02-05 DIAGNOSIS — D50.9 IRON DEFICIENCY ANEMIA, UNSPECIFIED IRON DEFICIENCY ANEMIA TYPE: ICD-10-CM

## 2020-02-05 DIAGNOSIS — D64.9 ANEMIA, UNSPECIFIED TYPE: Primary | ICD-10-CM

## 2020-02-05 DIAGNOSIS — E61.1 IRON DEFICIENCY: ICD-10-CM

## 2020-02-05 RX ORDER — ONDANSETRON 4 MG/1
TABLET, ORALLY DISINTEGRATING ORAL
COMMUNITY
Start: 2019-12-04

## 2020-02-05 RX ORDER — OXYCODONE HYDROCHLORIDE 5 MG/1
TABLET ORAL
COMMUNITY
Start: 2019-12-04

## 2020-02-05 RX ORDER — NORGESTREL AND ETHINYL ESTRADIOL 0.3-0.03MG
KIT ORAL
COMMUNITY
Start: 2019-12-10

## 2020-02-05 RX ORDER — ACETAMINOPHEN 500 MG
1000 TABLET ORAL
COMMUNITY
Start: 2019-12-02

## 2020-02-05 RX ORDER — IBUPROFEN 600 MG/1
600 TABLET ORAL
COMMUNITY
Start: 2019-12-02

## 2020-02-05 NOTE — PROGRESS NOTES
Tahri Hernandez is a 30 y.o. female presenting today for a follow-up appointment. Patient is ambulatory with no assistive devices, denies any generalized pain. Patient has no other complaints at this time. Chief Complaint   Patient presents with    Anemia    Follow-up     Visit Vitals  /59 (BP 1 Location: Left arm, BP Patient Position: Sitting)   Pulse 80   Temp 98.4 °F (36.9 °C) (Oral)   Resp 18   Ht 5' 6\" (1.676 m)   Wt 176 lb (79.8 kg)   LMP 01/08/2020   SpO2 99%   BMI 28.41 kg/m²     Current Outpatient Medications   Medication Sig    LOW-OGESTREL, 28, 0.3-30 mg-mcg tab     acetaminophen (TYLENOL) 500 mg tablet Take 1,000 mg by mouth.  ondansetron (ZOFRAN ODT) 4 mg disintegrating tablet     oxyCODONE IR (ROXICODONE) 5 mg immediate release tablet     ibuprofen (MOTRIN) 600 mg tablet Take 600 mg by mouth.  chlorhexidine (PERIDEX) 0.12 % solution Rinse with 1/2 ounce (15 MILLILITERS) by mouth for 30 seconds then spit out. use twice a day    polyethylene glycol (MIRALAX) 17 gram/dose powder      No current facility-administered medications for this visit. Fall Risk Assessment, last 12 mths 2/5/2020   Able to walk? Yes   Fall in past 12 months? No     3 most recent PHQ Screens 2/5/2020   Little interest or pleasure in doing things Not at all   Feeling down, depressed, irritable, or hopeless Not at all   Total Score PHQ 2 0     Abuse Screening Questionnaire 2/5/2020   Do you ever feel afraid of your partner? N   Are you in a relationship with someone who physically or mentally threatens you? N   Is it safe for you to go home?  Y     Health Maintenance Due   Topic Date Due    DTaP/Tdap/Td series (1 - Tdap) 04/30/1999    PAP AKA CERVICAL CYTOLOGY  04/30/2009    Influenza Age 5 to Adult  08/01/2019     Learning Assessment 4/23/2019   PRIMARY LEARNER Patient   HIGHEST LEVEL OF EDUCATION - PRIMARY LEARNER  4 YEARS OF COLLEGE   PRIMARY LANGUAGE ENGLISH   LEARNER PREFERENCE PRIMARY LISTENING READING   ANSWERED BY self   RELATIONSHIP SELF     Medications no longer taking/discontinued: motrin    Patient currently taking Antiplatelet therapy? no    1. Have you been to the ER, urgent care clinic since your last visit? Hospitalized since your last visit? no     2. Have you seen or consulted any other health care providers outside of the [address] [place] [address] since your last visit? Include any pap smears or colon screening.  no

## 2020-02-05 NOTE — PROGRESS NOTES
Duane Jaeger is a 30 y.o. 1990 female with Patient with history of menorrhagia and iron deficiency anemia, status post IV iron Injectafer x2 completed on 4/15/2019, she is s/p patch MD iron plus and has been given birth control pills, here for follow-up. On review of systems today she denies any fevers, chills, shortness of breath, nausea vomiting or abdominal pain. Easy fatigue has resolved. She says is still having heavy menses. Pagophagia has resolved. All other points of ROS have been reviewed and were negative. ECOG performance status 0. Independent with ADLs and IADLs. Easy fatigue. LMP: 20       History reviewed. No pertinent past medical history. Past Surgical History:   Procedure Laterality Date    HX  SECTION      x3     Social History     Socioeconomic History    Marital status:      Spouse name: Not on file    Number of children: Not on file    Years of education: Not on file    Highest education level: Not on file   Tobacco Use    Smoking status: Never Smoker    Smokeless tobacco: Never Used   Substance and Sexual Activity    Alcohol use: Not Currently     Frequency: Never    Drug use: Never    Sexual activity: Yes     Partners: Male     Birth control/protection: Surgical     Comment: Spouse - vasectomy     Family History   Problem Relation Age of Onset    No Known Problems Mother     No Known Problems Father        Current Outpatient Medications   Medication Sig Dispense Refill    LOW-OGESTREL, 28, 0.3-30 mg-mcg tab       acetaminophen (TYLENOL) 500 mg tablet Take 1,000 mg by mouth.  ondansetron (ZOFRAN ODT) 4 mg disintegrating tablet       oxyCODONE IR (ROXICODONE) 5 mg immediate release tablet       ibuprofen (MOTRIN) 600 mg tablet Take 600 mg by mouth.  chlorhexidine (PERIDEX) 0.12 % solution Rinse with 1/2 ounce (15 MILLILITERS) by mouth for 30 seconds then spit out.  use twice a day  0    polyethylene glycol (MIRALAX) 17 gram/dose powder          Allergies   Allergen Reactions    Chloroquine Itching       Review of Systems  On review of systems today she denies any fevers, chills, shortness of breath, nausea vomiting or abdominal pain. Easy fatigue has resolved. She says is still having heavy menses. She is back to chewing ice. Also reports  ECOG performance status 0. Independent with ADLs and IADLs. Easy fatigue.         Objective:  Visit Vitals  /59 (BP 1 Location: Left arm, BP Patient Position: Sitting)   Pulse 80   Temp 98.4 °F (36.9 °C) (Oral)   Resp 18   Ht 5' 6\" (1.676 m)   Wt 79.8 kg (176 lb)   LMP 01/08/2020   SpO2 99%   BMI 28.41 kg/m²       Physical Exam:   General appearance - alert, well appearing, and in no distress  Mental status - alert, oriented to person, place, and time  EYE-MARKUS, EOMI  ENT-ENT exam normal, no neck nodes or sinus tenderness  Mouth - mucous membranes moist, pharynx normal without lesions  Neck - supple, no significant adenopathy   Chest - clear to auscultation, no wheezes, rales or rhonchi, symmetric air entry   Heart - normal rate and regular rhythm   Abdomen - soft, nontender, nondistended, no masses or organomegaly  Lymph- no adenopathy palpable  Ext-no pedal edema noted  Skin-Warm and dry. Neuro -alert, oriented, normal speech, no focal findings or movement disorder noted      Diagnostic Imaging     No results found for this or any previous visit. No results found for this or any previous visit. No results found for this or any previous visit.     Lab Results  Lab Results   Component Value Date/Time    WBC 5.8 01/29/2020 10:30 AM    HGB 12.0 01/29/2020 10:30 AM    HCT 38.2 01/29/2020 10:30 AM    PLATELET 482 61/38/7039 10:30 AM    MCV 79.3 01/29/2020 10:30 AM       Lab Results   Component Value Date/Time    Sodium 137 07/17/2019 09:40 AM    Potassium 4.0 07/17/2019 09:40 AM    Chloride 105 07/17/2019 09:40 AM    CO2 28 07/17/2019 09:40 AM    Anion gap 4 07/17/2019 09:40 AM Glucose 81 07/17/2019 09:40 AM    BUN 12 07/17/2019 09:40 AM    Creatinine 0.79 07/17/2019 09:40 AM    BUN/Creatinine ratio 15 07/17/2019 09:40 AM    GFR est AA >60 07/17/2019 09:40 AM    GFR est non-AA >60 07/17/2019 09:40 AM    Calcium 9.0 07/17/2019 09:40 AM    AST (SGOT) 9 (L) 07/17/2019 09:40 AM    Alk. phosphatase 50 07/17/2019 09:40 AM    Protein, total 7.8 07/17/2019 09:40 AM    Albumin 3.8 07/17/2019 09:40 AM    Globulin 4.0 07/17/2019 09:40 AM    A-G Ratio 1.0 07/17/2019 09:40 AM    ALT (SGPT) 21 07/17/2019 09:40 AM       Follow-up with PCP for health maintenance. Assessment/Plan:  1. Anemia, unspecified type  Patient with iron deficiency anemia status post iron infusion x2 on 4/15/19. She is clinically doing very well. On 7/17/2019, transferrin saturation with 32% and ferritin 445. On 10/16/2019, WBC 4.9, H&H 10.2/33.4, platelet 439. MCV 80.5, transferrin saturation 16%, ferritin 159. Also a ferritin is 159 patient is becoming iron deficient again with a transferrin saturation was decreased almost by 50% to 16%. She does not want oral iron for now. Recommended PATCH MD iron plus. On January 29, 2020 WBC 5.8, H&H 12/38.4, platelet 320. Ferritin 308, transferrin saturation 16%. Normal ESR and CRP. No need for IV iron for now.      2. Menorrhagia: Follow-up with OB/GYN as scheduled.  She will start taking the birth control pills soon.     RTC 3 months with repeat CBC, ferritin and iron profile          Vonda Carlisle MD

## 2020-02-12 DIAGNOSIS — D50.9 IRON DEFICIENCY ANEMIA, UNSPECIFIED IRON DEFICIENCY ANEMIA TYPE: ICD-10-CM

## 2020-02-12 DIAGNOSIS — E61.1 IRON DEFICIENCY: ICD-10-CM

## 2020-02-13 ENCOUNTER — HOSPITAL ENCOUNTER (OUTPATIENT)
Dept: INFUSION THERAPY | Age: 30
End: 2020-02-13

## 2020-04-28 ENCOUNTER — HOSPITAL ENCOUNTER (OUTPATIENT)
Dept: INFUSION THERAPY | Age: 30
Discharge: HOME | End: 2020-04-28
Payer: OTHER GOVERNMENT

## 2020-04-28 VITALS
DIASTOLIC BLOOD PRESSURE: 61 MMHG | HEART RATE: 79 BPM | SYSTOLIC BLOOD PRESSURE: 106 MMHG | OXYGEN SATURATION: 100 % | TEMPERATURE: 98 F

## 2020-04-28 DIAGNOSIS — D64.9 ANEMIA, UNSPECIFIED TYPE: ICD-10-CM

## 2020-04-28 LAB
ALBUMIN SERPL-MCNC: 3.9 G/DL (ref 3.4–5)
ALBUMIN/GLOB SERPL: 1 {RATIO} (ref 0.8–1.7)
ALP SERPL-CCNC: 49 U/L (ref 45–117)
ALT SERPL-CCNC: 19 U/L (ref 13–56)
ANION GAP SERPL CALC-SCNC: 4 MMOL/L (ref 3–18)
AST SERPL-CCNC: 7 U/L (ref 10–38)
BASO+EOS+MONOS # BLD AUTO: 0.3 K/UL (ref 0–2.3)
BASO+EOS+MONOS NFR BLD AUTO: 8 % (ref 0.1–17)
BILIRUB SERPL-MCNC: 0.3 MG/DL (ref 0.2–1)
BUN SERPL-MCNC: 13 MG/DL (ref 7–18)
BUN/CREAT SERPL: 20 (ref 12–20)
CALCIUM SERPL-MCNC: 8.8 MG/DL (ref 8.5–10.1)
CHLORIDE SERPL-SCNC: 107 MMOL/L (ref 100–111)
CO2 SERPL-SCNC: 28 MMOL/L (ref 21–32)
CREAT SERPL-MCNC: 0.66 MG/DL (ref 0.6–1.3)
DIFFERENTIAL METHOD BLD: ABNORMAL
ERYTHROCYTE [DISTWIDTH] IN BLOOD BY AUTOMATED COUNT: 13.7 % (ref 11.5–14.5)
FERRITIN SERPL-MCNC: 184 NG/ML (ref 8–388)
GLOBULIN SER CALC-MCNC: 4 G/DL (ref 2–4)
GLUCOSE SERPL-MCNC: 83 MG/DL (ref 74–99)
HCT VFR BLD AUTO: 34.8 % (ref 36–48)
HGB BLD-MCNC: 10.8 G/DL (ref 12–16)
IRON SATN MFR SERPL: 18 % (ref 20–50)
IRON SERPL-MCNC: 61 UG/DL (ref 50–175)
LYMPHOCYTES # BLD: 2.1 K/UL (ref 1.1–5.9)
LYMPHOCYTES NFR BLD: 50 % (ref 14–44)
MCH RBC QN AUTO: 24.4 PG (ref 25–35)
MCHC RBC AUTO-ENTMCNC: 31 G/DL (ref 31–37)
MCV RBC AUTO: 78.7 FL (ref 78–102)
NEUTS SEG # BLD: 1.8 K/UL (ref 1.8–9.5)
NEUTS SEG NFR BLD: 42 % (ref 40–70)
PLATELET # BLD AUTO: 310 K/UL (ref 140–440)
POTASSIUM SERPL-SCNC: 3.3 MMOL/L (ref 3.5–5.5)
PROT SERPL-MCNC: 7.9 G/DL (ref 6.4–8.2)
RBC # BLD AUTO: 4.42 M/UL (ref 4.1–5.1)
SODIUM SERPL-SCNC: 139 MMOL/L (ref 136–145)
TIBC SERPL-MCNC: 330 UG/DL (ref 250–450)
WBC # BLD AUTO: 4.2 K/UL (ref 4.5–13)

## 2020-04-28 PROCEDURE — 36415 COLL VENOUS BLD VENIPUNCTURE: CPT

## 2020-04-28 PROCEDURE — 82728 ASSAY OF FERRITIN: CPT

## 2020-04-28 PROCEDURE — 85025 COMPLETE CBC W/AUTO DIFF WBC: CPT

## 2020-04-28 PROCEDURE — 80053 COMPREHEN METABOLIC PANEL: CPT

## 2020-04-28 PROCEDURE — 83540 ASSAY OF IRON: CPT

## 2020-04-28 NOTE — PROGRESS NOTES
[facility] Progress Note    Date: 2020    Name: Larissa Celeste    MRN: [de-identified]         : 1990    Peripheral Lab Draw      Ms. Meadows to [facility], ambulatory at 0910 accompanied by self. Pt was assessed and education was provided. Ms. Montana Pereira vitals were reviewed and patient was observed for 5 minutes prior to treatment. Visit Vitals  /61 (BP 1 Location: Left leg, BP Patient Position: Sitting)   Pulse 79   Temp 98 °F (36.7 °C)   SpO2 100%     Recent Results (from the past 12 hour(s))   CBC WITH 3 PART DIFF    Collection Time: 20  9:00 AM   Result Value Ref Range    WBC 4.2 (L) 4.5 - 13.0 K/uL    RBC 4.42 4.10 - 5.10 M/uL    HGB 10.8 (L) 12.0 - 16.0 g/dL    HCT 34.8 (L) 36 - 48 %    MCV 78.7 78 - 102 FL    MCH 24.4 (L) 25.0 - 35.0 PG    MCHC 31.0 31 - 37 g/dL    RDW 13.7 11.5 - 14.5 %    PLATELET 155 413 - 209 K/uL    NEUTROPHILS 42 40 - 70 %    MIXED CELLS 8 0.1 - 17 %    LYMPHOCYTES 50 (H) 14 - 44 %    ABS. NEUTROPHILS 1.8 1.8 - 9.5 K/UL    ABS. MIXED CELLS 0.3 0.0 - 2.3 K/uL    ABS. LYMPHOCYTES 2.1 1.1 - 5.9 K/UL    DF AUTOMATED     METABOLIC PANEL, COMPREHENSIVE    Collection Time: 20  9:20 AM   Result Value Ref Range    Sodium 139 136 - 145 mmol/L    Potassium 3.3 (L) 3.5 - 5.5 mmol/L    Chloride 107 100 - 111 mmol/L    CO2 28 21 - 32 mmol/L    Anion gap 4 3.0 - 18 mmol/L    Glucose 83 74 - 99 mg/dL    BUN 13 7.0 - 18 MG/DL    Creatinine 0.66 0.6 - 1.3 MG/DL    BUN/Creatinine ratio 20 12 - 20      GFR est AA >60 >60 ml/min/1.73m2    GFR est non-AA >60 >60 ml/min/1.73m2    Calcium 8.8 8.5 - 10.1 MG/DL    Bilirubin, total 0.3 0.2 - 1.0 MG/DL    ALT (SGPT) 19 13 - 56 U/L    AST (SGOT) 7 (L) 10 - 38 U/L    Alk.  phosphatase 49 45 - 117 U/L    Protein, total 7.9 6.4 - 8.2 g/dL    Albumin 3.9 3.4 - 5.0 g/dL    Globulin 4.0 2.0 - 4.0 g/dL    A-G Ratio 1.0 0.8 - 1.7         Blood obtained peripherally from left arm x 1 attempt with butterfly needle and sent to lab for Cbc w/diff, Cmp, iron Profile and Ferritin per written orders. No bleeding or hematoma noted at site. Gauze and coban applied. Ms. Tanisha Chong tolerated the phlebotomy, and had no complaints. Patient armband removed and shredded. Ms. Tanisha Chong was discharged from [place] [address] in stable condition at [phone].      Tamar Fritz Phlebotomist PCT  April 28, 2020  11:53 AM

## 2020-05-05 ENCOUNTER — VIRTUAL VISIT (OUTPATIENT)
Dept: ONCOLOGY | Age: 30
End: 2020-05-05

## 2020-05-05 DIAGNOSIS — D50.9 IRON DEFICIENCY ANEMIA, UNSPECIFIED IRON DEFICIENCY ANEMIA TYPE: Primary | ICD-10-CM

## 2020-05-05 DIAGNOSIS — N92.4 EXCESSIVE BLEEDING IN PREMENOPAUSAL PERIOD: ICD-10-CM

## 2020-05-05 NOTE — PROGRESS NOTES
Brian Gerard is a 26 y.o. female presenting today for a follow-up appointment. Patient is at home; and verified by 2 patient identifiers with verbal permission to start virtual visit. Patient accompanied by self, denies any generalized pain. Patient has no other complaints at this time. Chief Complaint   Patient presents with    Anemia     follow up       Visit Vitals  LMP 04/18/2020       Depression Screening:  3 most recent PHQ Screens 5/5/2020   Little interest or pleasure in doing things Not at all   Feeling down, depressed, irritable, or hopeless Not at all   Total Score PHQ 2 0       Abuse Screening:  Abuse Screening Questionnaire 2/5/2020   Do you ever feel afraid of your partner? N   Are you in a relationship with someone who physically or mentally threatens you? N   Is it safe for you to go home? Y       Fall Risk  Fall Risk Assessment, last 12 mths 2/5/2020   Able to walk? Yes   Fall in past 12 months? No       Coordination of Care:  1. Have you been to the ER, urgent care clinic since your last visit? Hospitalized since your last visit?  no     2. Have you seen or consulted any other health care providers outside of the [address] [place] [address] since your last visit? Include any pap smears or colon screening. no    Additional instruction for patient to standby for connection with Dr. Nadia Cosby.

## 2020-05-05 NOTE — PROGRESS NOTES
Cosmo García is a 26 y.o. female who was seen by synchronous (real-time) audio-video technology on 5/5/2020. Consent: Cosmo García, who was seen by synchronous (real-time) audio-video technology, and/or her healthcare decision maker, is aware that this patient-initiated, Telehealth encounter on 5/5/2020 is a billable service, with coverage as determined by her insurance carrier. She is aware that she may receive a bill and has provided verbal consent to proceed: Yes. Assessment & Plan:   Diagnoses and all orders for this visit:    1. Iron deficiency anemia, unspecified iron deficiency anemia type    2. Excessive bleeding in premenopausal period      The complexity of medical decision making for this visit is moderate       1. Anemia, unspecified type  Patient with iron deficiency anemia status post iron infusion x2 on 4/15/19. She is clinically doing very well.  On 7/17/2019, transferrin saturation with 32% and ferritin 445. On 10/16/2019, WBC 4.9, H&H 10.2/33.4, platelet 347. MCV 80.5, transferrin saturation 16%, ferritin 159. She does not want oral iron for now. Recommended PATCH MD iron plus. On January 29, 2020 WBC 5.8, H&H 12/38.4, platelet 055. Ferritin 308, transferrin saturation 16%. Normal ESR and CRP. On 4/28/2020, WBC 4.2, H&H 10.8/35, platelet 969. MCV 78.7, ferritin 184, transferrin saturation 18%, BUN 13 and creatinine 0.86.  *Insisted on the importance to have the patch on  *Recommended stool softener if needed  *RTC in 3 months with repeat labs     2. Menorrhagia: Follow-up with OB/GYN as scheduled. She is still not continuously taking the birth control pills.   I insisted on the importance of that otherwise I recommended to have an IUD placed.     RTC 3 months with repeat CBC, ferritin and iron profile        I spent at least 23 minutes on this visit with this established patient. [phone]  Subjective:   Cosmo García is a 26 y.o. female with Patient with history of menorrhagia and iron deficiency anemia, status post IV iron Injectafer x2 completed on 4/15/2019, she is s/p patch MD iron plus and has been given birth control pills, here for follow-up. On review of systems today she denies any fevers, chills, shortness of breath, nausea vomiting or abdominal pain.  Easy fatigue has resolved. Leroy Abernathy says is still having heavy menses. Pagophagia has still resolved. All other points of ROS have been reviewed and were negative. ECOG performance status 0.  Independent with ADLs and IADLs. Easy fatigue.      LMP:        Prior to Admission medications    Medication Sig Start Date End Date Taking? Authorizing Provider   ondansetron (ZOFRAN ODT) 4 mg disintegrating tablet  12/4/19   Provider, Historical   oxyCODONE IR (ROXICODONE) 5 mg immediate release tablet  12/4/19   Provider, Historical   LOW-OGESTREL, 28, 0.3-30 mg-mcg tab  12/10/19   Provider, Historical   ibuprofen (MOTRIN) 600 mg tablet Take 600 mg by mouth. 12/2/19   Provider, Historical   acetaminophen (TYLENOL) 500 mg tablet Take 1,000 mg by mouth. 12/2/19   Provider, Historical   chlorhexidine (PERIDEX) 0.12 % solution Rinse with 1/2 ounce (15 MILLILITERS) by mouth for 30 seconds then spit out.  use twice a day 8/7/19   Provider, Historical   polyethylene glycol (MIRALAX) 17 gram/dose powder  2/2/19   Provider, Historical     Allergies   Allergen Reactions    Chloroquine Itching       Patient Active Problem List   Diagnosis Code    Iron deficiency anemia D50.9    Menorrhagia N92.0    Iron deficiency E61.1    Menorrhagia, premenopausal N92.4     Patient Active Problem List    Diagnosis Date Noted    Menorrhagia 11/01/2019    Iron deficiency 11/01/2019    Menorrhagia, premenopausal 11/01/2019    Iron deficiency anemia 07/23/2019     Current Outpatient Medications   Medication Sig Dispense Refill    ondansetron (ZOFRAN ODT) 4 mg disintegrating tablet       oxyCODONE IR (ROXICODONE) 5 mg immediate release tablet       LOW-OGESTREL, 28, 0.3-30 mg-mcg tab       ibuprofen (MOTRIN) 600 mg tablet Take 600 mg by mouth.  acetaminophen (TYLENOL) 500 mg tablet Take 1,000 mg by mouth.  chlorhexidine (PERIDEX) 0.12 % solution Rinse with 1/2 ounce (15 MILLILITERS) by mouth for 30 seconds then spit out. use twice a day  0    polyethylene glycol (MIRALAX) 17 gram/dose powder        Allergies   Allergen Reactions    Chloroquine Itching     No past medical history on file. Past Surgical History:   Procedure Laterality Date    HX  SECTION      x3     Family History   Problem Relation Age of Onset    No Known Problems Mother     No Known Problems Father      Social History     Tobacco Use    Smoking status: Never Smoker    Smokeless tobacco: Never Used   Substance Use Topics    Alcohol use: Not Currently     Frequency: Never       ROS      Objective: There were no vitals taken for this visit. General: alert, cooperative, no distress   Mental  status: normal mood, behavior, speech, dress, motor activity, and thought processes, able to follow commands   HENT: NCAT   Neck: no visualized mass   Resp: no respiratory distress   Neuro: no gross deficits   Skin: no discoloration or lesions of concern on visible areas   Psychiatric: normal affect, consistent with stated mood, no evidence of hallucinations     Additional exam findings: We discussed the expected course, resolution and complications of the diagnosis(es) in detail. Medication risks, benefits, costs, interactions, and alternatives were discussed as indicated. I advised her to contact the office if her condition worsens, changes or fails to improve as anticipated. She expressed understanding with the diagnosis(es) and plan. Bonnie Carrillo is a 26 y.o. female who was evaluated by a video visit encounter for concerns as above. Patient identification was verified prior to start of the visit. A caregiver was present when appropriate.  Due to this being a TeleHealth encounter (During YZFSQ-99 public health emergency), evaluation of the following organ systems was limited: Vitals/Constitutional/EENT/Resp/CV/GI//MS/Neuro/Skin/Heme-Lymph-Imm. Pursuant to the emergency declaration under the [address] [place] [address] waiver authority and the [employer] and [employer] Act, this Virtual  Visit was conducted, with patient's (and/or legal guardian's) consent, to reduce the patient's risk of exposure to COVID-19 and provide necessary medical care. Services were provided through a video synchronous discussion virtually to substitute for in-person clinic visit. Patient and provider were located at their individual homes.       Uriel Seymour MD

## 2020-07-29 ENCOUNTER — HOSPITAL ENCOUNTER (OUTPATIENT)
Dept: INFUSION THERAPY | Age: 30
Discharge: HOME | End: 2020-07-29
Payer: OTHER GOVERNMENT

## 2020-07-29 VITALS
TEMPERATURE: 97.3 F | DIASTOLIC BLOOD PRESSURE: 79 MMHG | SYSTOLIC BLOOD PRESSURE: 119 MMHG | OXYGEN SATURATION: 100 % | HEART RATE: 71 BPM

## 2020-07-29 LAB
ALBUMIN SERPL-MCNC: 3.9 G/DL (ref 3.4–5)
ALBUMIN/GLOB SERPL: 1.1 {RATIO} (ref 0.8–1.7)
ALP SERPL-CCNC: 47 U/L (ref 45–117)
ALT SERPL-CCNC: 22 U/L (ref 13–56)
ANION GAP SERPL CALC-SCNC: 4 MMOL/L (ref 3–18)
AST SERPL-CCNC: 6 U/L (ref 10–38)
BASO+EOS+MONOS # BLD AUTO: 0.3 K/UL (ref 0–2.3)
BASO+EOS+MONOS NFR BLD AUTO: 6 % (ref 0.1–17)
BILIRUB SERPL-MCNC: 0.4 MG/DL (ref 0.2–1)
BUN SERPL-MCNC: 10 MG/DL (ref 7–18)
BUN/CREAT SERPL: 14 (ref 12–20)
CALCIUM SERPL-MCNC: 8.8 MG/DL (ref 8.5–10.1)
CHLORIDE SERPL-SCNC: 107 MMOL/L (ref 100–111)
CO2 SERPL-SCNC: 29 MMOL/L (ref 21–32)
CREAT SERPL-MCNC: 0.7 MG/DL (ref 0.6–1.3)
DIFFERENTIAL METHOD BLD: ABNORMAL
ERYTHROCYTE [DISTWIDTH] IN BLOOD BY AUTOMATED COUNT: 14 % (ref 11.5–14.5)
FERRITIN SERPL-MCNC: 98 NG/ML (ref 8–388)
GLOBULIN SER CALC-MCNC: 3.7 G/DL (ref 2–4)
GLUCOSE SERPL-MCNC: 83 MG/DL (ref 74–99)
HCT VFR BLD AUTO: 33.6 % (ref 36–48)
HGB BLD-MCNC: 10.3 G/DL (ref 12–16)
IRON SATN MFR SERPL: 15 % (ref 20–50)
IRON SERPL-MCNC: 53 UG/DL (ref 50–175)
LYMPHOCYTES # BLD: 2.4 K/UL (ref 1.1–5.9)
LYMPHOCYTES NFR BLD: 54 % (ref 14–44)
MCH RBC QN AUTO: 24.4 PG (ref 25–35)
MCHC RBC AUTO-ENTMCNC: 30.7 G/DL (ref 31–37)
MCV RBC AUTO: 79.6 FL (ref 78–102)
NEUTS SEG # BLD: 1.7 K/UL (ref 1.8–9.5)
NEUTS SEG NFR BLD: 40 % (ref 40–70)
PLATELET # BLD AUTO: 334 K/UL (ref 140–440)
POTASSIUM SERPL-SCNC: 4.2 MMOL/L (ref 3.5–5.5)
PROT SERPL-MCNC: 7.6 G/DL (ref 6.4–8.2)
RBC # BLD AUTO: 4.22 M/UL (ref 4.1–5.1)
SODIUM SERPL-SCNC: 140 MMOL/L (ref 136–145)
T4 FREE SERPL-MCNC: 0.9 NG/DL (ref 0.7–1.5)
TIBC SERPL-MCNC: 363 UG/DL (ref 250–450)
TSH SERPL DL<=0.05 MIU/L-ACNC: 2.35 UIU/ML (ref 0.36–3.74)
WBC # BLD AUTO: 4.4 K/UL (ref 4.5–13)

## 2020-07-29 PROCEDURE — 82728 ASSAY OF FERRITIN: CPT

## 2020-07-29 PROCEDURE — 36415 COLL VENOUS BLD VENIPUNCTURE: CPT

## 2020-07-29 PROCEDURE — 84439 ASSAY OF FREE THYROXINE: CPT

## 2020-07-29 PROCEDURE — 80053 COMPREHEN METABOLIC PANEL: CPT

## 2020-07-29 PROCEDURE — 83540 ASSAY OF IRON: CPT

## 2020-07-29 PROCEDURE — 84443 ASSAY THYROID STIM HORMONE: CPT

## 2020-07-29 PROCEDURE — 85025 COMPLETE CBC W/AUTO DIFF WBC: CPT

## 2020-07-29 NOTE — PROGRESS NOTES
[facility] Progress Note    Date: 2020    Name: Mario Morton    MRN: [de-identified]         : 1990    Peripheral Lab Draw      Ms. Meadows to [facility], ambulatory at 0900 accompanied by self. Pt was assessed and education was provided. Ms. Austen Flores vitals were reviewed and patient was observed for 5 minutes prior to treatment. Visit Vitals  /79 (BP 1 Location: Right arm, BP Patient Position: Sitting)   Pulse 71   Temp 97.3 °F (36.3 °C)   SpO2 100%     Recent Results (from the past 12 hour(s))   CBC WITH 3 PART DIFF    Collection Time: 20  9:10 AM   Result Value Ref Range    WBC 4.4 (L) 4.5 - 13.0 K/uL    RBC 4.22 4.10 - 5.10 M/uL    HGB 10.3 (L) 12.0 - 16.0 g/dL    HCT 33.6 (L) 36 - 48 %    MCV 79.6 78 - 102 FL    MCH 24.4 (L) 25.0 - 35.0 PG    MCHC 30.7 (L) 31 - 37 g/dL    RDW 14.0 11.5 - 14.5 %    PLATELET 462 730 - 169 K/uL    NEUTROPHILS 40 40 - 70 %    MIXED CELLS 6 0.1 - 17 %    LYMPHOCYTES 54 (H) 14 - 44 %    ABS. NEUTROPHILS 1.7 (L) 1.8 - 9.5 K/UL    ABS. MIXED CELLS 0.3 0.0 - 2.3 K/uL    ABS. LYMPHOCYTES 2.4 1.1 - 5.9 K/UL    DF AUTOMATED         Blood obtained peripherally from left arm x 1 attempt with butterfly needle and sent to lab for Cbc w/diff, Cmp, Iron Profile and Ferritin per written orders. No bleeding or hematoma noted at site. Gauze and coban applied. Ms. Karen Honeycutt tolerated the phlebotomy, and had no complaints. Patient armband removed and shredded. Ms. Karen Honeycutt was discharged from [place] [address] in stable condition at 0910.      Yousif Esparza Phlebotomist PCT  2020  10:28 AM

## 2020-08-11 ENCOUNTER — VIRTUAL VISIT (OUTPATIENT)
Dept: ONCOLOGY | Age: 30
End: 2020-08-11

## 2020-08-11 DIAGNOSIS — D50.9 IRON DEFICIENCY ANEMIA, UNSPECIFIED IRON DEFICIENCY ANEMIA TYPE: Primary | ICD-10-CM

## 2020-08-11 DIAGNOSIS — N92.4 EXCESSIVE BLEEDING IN PREMENOPAUSAL PERIOD: ICD-10-CM

## 2020-08-11 DIAGNOSIS — N92.4 MENORRHAGIA, PREMENOPAUSAL: ICD-10-CM

## 2020-08-11 NOTE — PROGRESS NOTES
Anthony Riley is a 26 y.o. female presenting today for a follow-up appointment via Telehealth. Identified patient using two identifiers (full name and ). Patient denies any complaints. Chief Complaint   Patient presents with    Anemia         Current Outpatient Medications   Medication Sig    OTHER,NON-FORMULARY, Take 1 Patch by mouth daily. Iron supplement    ondansetron (ZOFRAN ODT) 4 mg disintegrating tablet     oxyCODONE IR (ROXICODONE) 5 mg immediate release tablet     LOW-OGESTREL, 28, 0.3-30 mg-mcg tab     ibuprofen (MOTRIN) 600 mg tablet Take 600 mg by mouth.  acetaminophen (TYLENOL) 500 mg tablet Take 1,000 mg by mouth.  chlorhexidine (PERIDEX) 0.12 % solution Rinse with 1/2 ounce (15 MILLILITERS) by mouth for 30 seconds then spit out. use twice a day    polyethylene glycol (MIRALAX) 17 gram/dose powder      No current facility-administered medications for this visit. Fall Risk Assessment, last 12 mths 2020   Able to walk? Yes   Fall in past 12 months? No       3 most recent PHQ Screens 2020   Little interest or pleasure in doing things Not at all   Feeling down, depressed, irritable, or hopeless Not at all   Total Score PHQ 2 0       Abuse Screening Questionnaire 2020   Do you ever feel afraid of your partner? N   Are you in a relationship with someone who physically or mentally threatens you? N   Is it safe for you to go home? Y       1. Have you been to the ER, urgent care clinic since your last visit? Hospitalized since your last visit? No    2. Have you seen or consulted any other health care providers outside of the [address] [place] [address] since your last visit? Include any pap smears or colon screening.  No

## 2020-08-11 NOTE — PROGRESS NOTES
Anthony Riley is a 26 y.o. female who was seen by synchronous (real-time) telephone technology on 8/11/2020. Consent: Anthony Riley, who was seen by synchronous (real-time) audio-technology, and/or her healthcare decision maker, is aware that this patient-initiated, Telehealth encounter on 8/11/2020 is a billable service, with coverage as determined by her insurance carrier. She is aware that she may receive a bill and has provided verbal consent to proceed: Yes. Assessment & Plan:   Diagnoses and all orders for this visit:    1. Iron deficiency anemia, unspecified iron deficiency anemia type  -     CBC WITH AUTOMATED DIFF; Future  -     FERRITIN; Future  -     IRON PROFILE; Future  -     METABOLIC PANEL, COMPREHENSIVE; Future    2. Menorrhagia, premenopausal    3. Excessive bleeding in premenopausal period      The complexity of medical decision making for this visit is moderate   Follow-up and Dispositions    · Return in about 3 months (around 11/11/2020). 1. Anemia, unspecified type  Patient with iron deficiency anemia status post iron infusion x2 on 4/15/19. She is clinically doing very well.  On 7/17/2019, transferrin saturation with 32% and ferritin 445. On 10/16/2019, WBC 4.9, H&H 10.2/33.4, platelet 414. MCV 80.5, transferrin saturation 16%, ferritin 159. She does not want oral iron for now. Recommended PATCH MD iron plus. On January 29, 2020 WBC 5.8, H&H 12/38.4, platelet 896. Ferritin 308, transferrin saturation 16%. Normal ESR and CRP. On 4/28/2020, WBC 4.2, H&H 10.8/35, platelet 762. MCV 78.7, ferritin 184, transferrin saturation 18%, BUN 13 and creatinine 0.86. On 7/29/20, WBC=4.4, H/H10.3/33.6, MCV=79.6, Platelets=334. Ferritin=98, TSAT=15%, BuN=10, creat=0.70.  *Insisted on the importance to have the patch on  *Recommended stool softener if needed  *Added oral iron every other day with sips or orange juice. RTC in 3 months with repeat labs     2. Menorrhagia: Follow-up with OB/GYN as scheduled. Says bleeding is better. RTC 3 months with repeat CBC, ferritin and iron profile        I spent at least 23 minutes on this visit with this established patient. [phone]  Subjective:   Cristiana Lizarraga is a 26 y.o. female with Patient with history of menorrhagia and iron deficiency anemia, status post IV iron Injectafer x2 completed on 4/15/2019, she is s/p patch MD iron plus and has been given birth control pills, here for follow-up. On review of systems today she denies any fevers, chills, shortness of breath, nausea vomiting or abdominal pain.  No fatigue.  She says is still having heavy menses. Pagophagia has still resolved. All other points of ROS have been reviewed and were negative. ECOG performance status 0.  Independent with ADLs and IADLs. Easy fatigue.             Prior to Admission medications    Medication Sig Start Date End Date Taking? Authorizing Provider   OTHER,NON-FORMULARY, Take 1 Patch by mouth daily. Iron supplement   Yes Provider, Historical   ondansetron (ZOFRAN ODT) 4 mg disintegrating tablet  12/4/19   Provider, Historical   oxyCODONE IR (ROXICODONE) 5 mg immediate release tablet  12/4/19   Provider, Historical   LOW-OGESTREL, 28, 0.3-30 mg-mcg tab  12/10/19   Provider, Historical   ibuprofen (MOTRIN) 600 mg tablet Take 600 mg by mouth. 12/2/19   Provider, Historical   acetaminophen (TYLENOL) 500 mg tablet Take 1,000 mg by mouth. 12/2/19   Provider, Historical   chlorhexidine (PERIDEX) 0.12 % solution Rinse with 1/2 ounce (15 MILLILITERS) by mouth for 30 seconds then spit out.  use twice a day 8/7/19   Provider, Historical   polyethylene glycol (MIRALAX) 17 gram/dose powder  2/2/19   Provider, Historical     Allergies   Allergen Reactions    Chloroquine Itching       Patient Active Problem List   Diagnosis Code    Iron deficiency anemia D50.9    Menorrhagia N92.0    Iron deficiency E61.1    Menorrhagia, premenopausal N92.4 Patient Active Problem List    Diagnosis Date Noted    Menorrhagia 2019    Iron deficiency 2019    Menorrhagia, premenopausal 2019    Iron deficiency anemia 2019     Current Outpatient Medications   Medication Sig Dispense Refill    OTHER,NON-FORMULARY, Take 1 Patch by mouth daily. Iron supplement      ondansetron (ZOFRAN ODT) 4 mg disintegrating tablet       oxyCODONE IR (ROXICODONE) 5 mg immediate release tablet       LOW-OGESTREL, 28, 0.3-30 mg-mcg tab       ibuprofen (MOTRIN) 600 mg tablet Take 600 mg by mouth.  acetaminophen (TYLENOL) 500 mg tablet Take 1,000 mg by mouth.  chlorhexidine (PERIDEX) 0.12 % solution Rinse with 1/2 ounce (15 MILLILITERS) by mouth for 30 seconds then spit out. use twice a day  0    polyethylene glycol (MIRALAX) 17 gram/dose powder        Allergies   Allergen Reactions    Chloroquine Itching     History reviewed. No pertinent past medical history. Past Surgical History:   Procedure Laterality Date    HX  SECTION      x3     Family History   Problem Relation Age of Onset    No Known Problems Mother     No Known Problems Father      Social History     Tobacco Use    Smoking status: Never Smoker    Smokeless tobacco: Never Used   Substance Use Topics    Alcohol use: Not Currently     Frequency: Never       ROS: as per HPI      Objective: There were no vitals taken for this visit. General: alert, cooperative, no distress     Additional exam findings: We discussed the expected course, resolution and complications of the diagnosis(es) in detail. Medication risks, benefits, costs, interactions, and alternatives were discussed as indicated. I advised her to contact the office if her condition worsens, changes or fails to improve as anticipated. She expressed understanding with the diagnosis(es) and plan. Richard Cooper is a 26 y.o. female who was evaluated by a telephone visit encounter for concerns as above. Patient identification was verified prior to start of the visit. A caregiver was present when appropriate. Due to this being a TeleHealth encounter (During JEY-53 public health emergency), evaluation of the following organ systems was limited: Vitals/Constitutional/EENT/Resp/CV/GI//MS/Neuro/Skin/Heme-Lymph-Imm. Pursuant to the emergency declaration under the [address] [place] [address], [facility] waiver authority and the [employer] and [employer] Act, this Virtual  Visit was conducted, with patient's (and/or legal guardian's) consent, to reduce the patient's risk of exposure to COVID-19 and provide necessary medical care. Services were provided through a video synchronous discussion virtually to substitute for in-person clinic visit. Patient and provider were located at their individual homes.       Rodolfo Cerda MD

## 2020-11-10 ENCOUNTER — HOSPITAL ENCOUNTER (OUTPATIENT)
Dept: INFUSION THERAPY | Age: 30
Discharge: HOME | End: 2020-11-10
Attending: INTERNAL MEDICINE
Payer: OTHER GOVERNMENT

## 2020-11-10 VITALS
DIASTOLIC BLOOD PRESSURE: 64 MMHG | TEMPERATURE: 97.6 F | RESPIRATION RATE: 18 BRPM | HEART RATE: 68 BPM | OXYGEN SATURATION: 100 % | SYSTOLIC BLOOD PRESSURE: 102 MMHG

## 2020-11-10 DIAGNOSIS — D50.9 IRON DEFICIENCY ANEMIA, UNSPECIFIED IRON DEFICIENCY ANEMIA TYPE: ICD-10-CM

## 2020-11-10 LAB
BASO+EOS+MONOS # BLD AUTO: 0.4 K/UL (ref 0–2.3)
BASO+EOS+MONOS NFR BLD AUTO: 9 % (ref 0.1–17)
DIFFERENTIAL METHOD BLD: ABNORMAL
ERYTHROCYTE [DISTWIDTH] IN BLOOD BY AUTOMATED COUNT: 13.4 % (ref 11.5–14.5)
FERRITIN SERPL-MCNC: 47 NG/ML (ref 8–388)
HCT VFR BLD AUTO: 34.7 % (ref 36–48)
HGB BLD-MCNC: 10.9 G/DL (ref 12–16)
IRON SATN MFR SERPL: 20 % (ref 20–50)
IRON SERPL-MCNC: 82 UG/DL (ref 50–175)
LYMPHOCYTES # BLD: 2.4 K/UL (ref 1.1–5.9)
LYMPHOCYTES NFR BLD: 50 % (ref 14–44)
MCH RBC QN AUTO: 24.3 PG (ref 25–35)
MCHC RBC AUTO-ENTMCNC: 31.4 G/DL (ref 31–37)
MCV RBC AUTO: 77.5 FL (ref 78–102)
NEUTS SEG # BLD: 1.9 K/UL (ref 1.8–9.5)
NEUTS SEG NFR BLD: 41 % (ref 40–70)
PLATELET # BLD AUTO: 314 K/UL (ref 140–440)
RBC # BLD AUTO: 4.48 M/UL (ref 4.1–5.1)
TIBC SERPL-MCNC: 405 UG/DL (ref 250–450)
WBC # BLD AUTO: 4.7 K/UL (ref 4.5–13)

## 2020-11-10 PROCEDURE — 82728 ASSAY OF FERRITIN: CPT

## 2020-11-10 PROCEDURE — 85025 COMPLETE CBC W/AUTO DIFF WBC: CPT

## 2020-11-10 PROCEDURE — 80053 COMPREHEN METABOLIC PANEL: CPT

## 2020-11-10 PROCEDURE — 83540 ASSAY OF IRON: CPT

## 2020-11-10 PROCEDURE — 36415 COLL VENOUS BLD VENIPUNCTURE: CPT

## 2020-11-10 NOTE — PROGRESS NOTES
[facility] Progress Note    Date: November 10, 2020    Name: Lorna Benítez    MRN: [de-identified]         : 1990    Peripheral Lab Draw      Ms. Meadows to [address] [place] [address], ambulatory at 0825 accompanied by self. Pt was assessed and education was provided. Ms. Arline Casillas vitals were reviewed and patient was observed for 5 minutes prior to treatment. Visit Vitals  /64 (BP 1 Location: Right arm, BP Patient Position: Sitting)   Pulse 68   Temp 97.6 °F (36.4 °C)   Resp 18   SpO2 100%       Blood obtained peripherally from RAC x 1 attempt with butterfly needle and sent to lab for cbc, cmp, ferritin, iron profile per written orders. No bleeding or hematoma noted at site. Guaze and coban applied. Ms. Billy Lockett tolerated the phlebotomy, and had no complaints. Patient armband removed and shredded. Ms. Billy Lockett was discharged from [place] [address] in stable condition at 0589. She is to return to [facility] for follow up. Ayesha Huddleston RN  November 10, 2020  8:36 AM

## 2020-11-11 ENCOUNTER — OFFICE VISIT (OUTPATIENT)
Age: 30
End: 2020-11-11
Payer: OTHER GOVERNMENT

## 2020-11-11 VITALS
HEART RATE: 78 BPM | WEIGHT: 167.8 LBS | TEMPERATURE: 99 F | HEIGHT: 66 IN | BODY MASS INDEX: 26.97 KG/M2 | DIASTOLIC BLOOD PRESSURE: 71 MMHG | OXYGEN SATURATION: 98 % | SYSTOLIC BLOOD PRESSURE: 110 MMHG

## 2020-11-11 DIAGNOSIS — N92.4 MENORRHAGIA, PREMENOPAUSAL: ICD-10-CM

## 2020-11-11 DIAGNOSIS — D50.9 IRON DEFICIENCY ANEMIA, UNSPECIFIED IRON DEFICIENCY ANEMIA TYPE: Primary | ICD-10-CM

## 2020-11-11 LAB
ALBUMIN SERPL-MCNC: 3.8 G/DL (ref 3.4–5)
ALBUMIN/GLOB SERPL: 0.9 {RATIO} (ref 0.8–1.7)
ALP SERPL-CCNC: 47 U/L (ref 45–117)
ALT SERPL-CCNC: 18 U/L (ref 13–56)
ANION GAP SERPL CALC-SCNC: 4 MMOL/L (ref 3–18)
AST SERPL-CCNC: 6 U/L (ref 10–38)
BILIRUB SERPL-MCNC: 0.4 MG/DL (ref 0.2–1)
BUN SERPL-MCNC: 10 MG/DL (ref 7–18)
BUN/CREAT SERPL: 14 (ref 12–20)
CALCIUM SERPL-MCNC: 9.3 MG/DL (ref 8.5–10.1)
CHLORIDE SERPL-SCNC: 104 MMOL/L (ref 100–111)
CO2 SERPL-SCNC: 30 MMOL/L (ref 21–32)
CREAT SERPL-MCNC: 0.73 MG/DL (ref 0.6–1.3)
GLOBULIN SER CALC-MCNC: 4.2 G/DL (ref 2–4)
GLUCOSE SERPL-MCNC: 51 MG/DL (ref 74–99)
POTASSIUM SERPL-SCNC: 3.9 MMOL/L (ref 3.5–5.5)
PROT SERPL-MCNC: 8 G/DL (ref 6.4–8.2)
SODIUM SERPL-SCNC: 138 MMOL/L (ref 136–145)

## 2020-11-11 PROCEDURE — 99213 OFFICE O/P EST LOW 20 MIN: CPT | Performed by: INTERNAL MEDICINE

## 2020-12-02 NOTE — PROGRESS NOTES
Raphael Bynum is a 26 y.o. female with Patient with history of menorrhagia and iron deficiency anemia, status post IV iron Injectafer x2 completed on 4/15/2019, she is s/p patch MD iron plus and has been given birth control pills, here for follow-up.     On review of systems today she denies any fevers, chills, shortness of breath, nausea vomiting or abdominal pain.  She reports easy fatigue. Andry Emmett says is still having heavy menses.  Pagophagia has recurred.  All other points of ROS have been reviewed and were negative. ECOG performance status 0.  Independent with ADLs and IADLs. Easy fatigue.           History reviewed. No pertinent past medical history. Past Surgical History:   Procedure Laterality Date    HX  SECTION      x3     Social History     Socioeconomic History    Marital status:      Spouse name: Not on file    Number of children: Not on file    Years of education: Not on file    Highest education level: Not on file   Tobacco Use    Smoking status: Never Smoker    Smokeless tobacco: Never Used   Substance and Sexual Activity    Alcohol use: Not Currently     Frequency: Never    Drug use: Never    Sexual activity: Yes     Partners: Male     Birth control/protection: Surgical     Comment: Spouse - vasectomy     Family History   Problem Relation Age of Onset    No Known Problems Mother     No Known Problems Father        Current Outpatient Medications   Medication Sig Dispense Refill    LOW-OGESTREL, 28, 0.3-30 mg-mcg tab       acetaminophen (TYLENOL) 500 mg tablet Take 1,000 mg by mouth.  chlorhexidine (PERIDEX) 0.12 % solution Rinse with 1/2 ounce (15 MILLILITERS) by mouth for 30 seconds then spit out. use twice a day  0    OTHER,NON-FORMULARY, Take 1 Patch by mouth daily.  Iron supplement      ondansetron (ZOFRAN ODT) 4 mg disintegrating tablet       oxyCODONE IR (ROXICODONE) 5 mg immediate release tablet       ibuprofen (MOTRIN) 600 mg tablet Take 600 mg by mouth.      polyethylene glycol (MIRALAX) 17 gram/dose powder          Allergies   Allergen Reactions    Chloroquine Itching       Review of Systems  As per HPi      Objective:  Visit Vitals  /71   Pulse 78   Temp 99 °F (37.2 °C)   Ht 5' 6\" (1.676 m)   Wt 76.1 kg (167 lb 12.8 oz)   SpO2 98%   BMI 27.08 kg/m²         Physical Exam:   General appearance - alert, well appearing, and in no distress  Mental status - alert, oriented to person, place, and time  EYE-MARKUS, EOMI  ENT-ENT exam normal, no neck nodes or sinus tenderness  Mouth - mucous membranes moist, pharynx normal without lesions  Neck - supple, no significant adenopathy   Chest - clear to auscultation, no wheezes, rales or rhonchi, symmetric air entry   Heart - normal rate and regular rhythm   Abdomen - soft, nontender, nondistended, no masses or organomegaly  Lymph- no adenopathy palpable  Ext-no pedal edema noted  Skin-Warm and dry. Neuro -alert, oriented, normal speech, no focal findings or movement disorder noted  Breast - no masses palapated b/l    Physical Exam     Diagnostic Imaging     No results found for this or any previous visit. No results found for this or any previous visit. No results found for this or any previous visit.     Lab Results  Lab Results   Component Value Date/Time    WBC 4.7 11/10/2020 08:30 AM    HGB 10.9 (L) 11/10/2020 08:30 AM    HCT 34.7 (L) 11/10/2020 08:30 AM    PLATELET 176 76/04/8684 08:30 AM    MCV 77.5 (L) 11/10/2020 08:30 AM       Lab Results   Component Value Date/Time    Sodium 138 11/10/2020 08:30 AM    Potassium 3.9 11/10/2020 08:30 AM    Chloride 104 11/10/2020 08:30 AM    CO2 30 11/10/2020 08:30 AM    Anion gap 4 11/10/2020 08:30 AM    Glucose 51 (LL) 11/10/2020 08:30 AM    BUN 10 11/10/2020 08:30 AM    Creatinine 0.73 11/10/2020 08:30 AM    BUN/Creatinine ratio 14 11/10/2020 08:30 AM    GFR est AA >60 11/10/2020 08:30 AM    GFR est non-AA >60 11/10/2020 08:30 AM    Calcium 9.3 11/10/2020 08:30 AM    Alk. phosphatase 47 11/10/2020 08:30 AM    Protein, total 8.0 11/10/2020 08:30 AM    Albumin 3.8 11/10/2020 08:30 AM    Globulin 4.2 (H) 11/10/2020 08:30 AM    A-G Ratio 0.9 11/10/2020 08:30 AM    ALT (SGPT) 18 11/10/2020 08:30 AM     Follow-up with PCP for health maintenance  Assessment/Plan:    ICD-10-CM ICD-9-CM    1. Iron deficiency anemia, unspecified iron deficiency anemia type  D50.9 280.9    2. Menorrhagia, premenopausal  N92.4 627.0      No orders of the defined types were placed in this encounter. 1.  Iron deficiency anemia, unspecified type  Patient with iron deficiency anemia status post iron infusion x2 on 4/15/19. Labs on 11/10/2020 showed ferritin 47, transferrin saturation 20%, BUN 10, creatinine 0.73, WBC 4.7, H&H 10.9/34.7, MCV 77.5, platelets 503. Patient is iron deficient again from her menorrhagia. Her MCV again is below 80 and she is symptomatic with easy fatigue and increased sleepiness. Also has pagophagia. Oral iron not helping. *Give IV iron with Injectafer but she would rather take the patch and oral iron. [id number] in 3 months with repeat labs     2. Menorrhagia: Follow-up with OB/GYN as scheduled.      RTC 3 months with repeat CBC, ferritin and iron profile        have labs drawn prior to ROV, call if any problems  There are no Patient Instructions on file for this visit.        Tony Francis MD Siliq Counseling:  I discussed with the patient the risks of Siliq including but not limited to new or worsening depression, suicidal thoughts and behavior, immunosuppression, malignancy, posterior leukoencephalopathy syndrome, and serious infections.  The patient understands that monitoring is required including a PPD at baseline and must alert us or the primary physician if symptoms of infection or other concerning signs are noted. There is also a special program designed to monitor depression which is required with Siliq.